# Patient Record
Sex: FEMALE | Race: WHITE | NOT HISPANIC OR LATINO | Employment: OTHER | URBAN - METROPOLITAN AREA
[De-identification: names, ages, dates, MRNs, and addresses within clinical notes are randomized per-mention and may not be internally consistent; named-entity substitution may affect disease eponyms.]

---

## 2023-04-27 ENCOUNTER — OFFICE VISIT (OUTPATIENT)
Dept: PRIMARY CARE | Facility: CLINIC | Age: 68
End: 2023-04-27
Payer: MEDICARE

## 2023-04-27 VITALS
WEIGHT: 256 LBS | OXYGEN SATURATION: 94 % | BODY MASS INDEX: 41.14 KG/M2 | SYSTOLIC BLOOD PRESSURE: 150 MMHG | HEART RATE: 59 BPM | TEMPERATURE: 97.6 F | HEIGHT: 66 IN | DIASTOLIC BLOOD PRESSURE: 78 MMHG

## 2023-04-27 DIAGNOSIS — R73.03 PRE-DIABETES: ICD-10-CM

## 2023-04-27 DIAGNOSIS — I10 BENIGN ESSENTIAL HYPERTENSION: ICD-10-CM

## 2023-04-27 DIAGNOSIS — E78.5 HYPERLIPIDEMIA LDL GOAL <70: ICD-10-CM

## 2023-04-27 DIAGNOSIS — R07.89 CHEST DISCOMFORT: ICD-10-CM

## 2023-04-27 DIAGNOSIS — M65.4 TENOSYNOVITIS, DE QUERVAIN: ICD-10-CM

## 2023-04-27 DIAGNOSIS — Z00.00 MEDICARE ANNUAL WELLNESS VISIT, SUBSEQUENT: Primary | ICD-10-CM

## 2023-04-27 DIAGNOSIS — Z12.31 ENCOUNTER FOR SCREENING MAMMOGRAM FOR MALIGNANT NEOPLASM OF BREAST: ICD-10-CM

## 2023-04-27 DIAGNOSIS — I25.10 ATHEROSCLEROSIS OF NATIVE CORONARY ARTERY OF NATIVE HEART WITHOUT ANGINA PECTORIS: ICD-10-CM

## 2023-04-27 PROBLEM — J30.2 SEASONAL ALLERGIC RHINITIS: Status: ACTIVE | Noted: 2020-06-16

## 2023-04-27 PROBLEM — I87.329 STASIS EDEMA WITH INFLAMMATION: Status: ACTIVE | Noted: 2023-04-27

## 2023-04-27 PROBLEM — R82.998 URINE LEUKOCYTES: Status: ACTIVE | Noted: 2023-04-27

## 2023-04-27 PROBLEM — M17.0 OSTEOARTHRITIS OF KNEES, BILATERAL: Status: ACTIVE | Noted: 2023-04-27

## 2023-04-27 PROBLEM — E78.00 PURE HYPERCHOLESTEROLEMIA: Status: ACTIVE | Noted: 2023-04-27

## 2023-04-27 PROBLEM — R93.89 ABNORMAL FINDING ON CT SCAN: Status: ACTIVE | Noted: 2023-04-27

## 2023-04-27 PROBLEM — M25.671 STIFFNESS OF RIGHT ANKLE, NOT ELSEWHERE CLASSIFIED: Status: ACTIVE | Noted: 2023-04-27

## 2023-04-27 PROBLEM — G89.29 CHRONIC BACK PAIN: Status: ACTIVE | Noted: 2023-04-27

## 2023-04-27 PROBLEM — N28.89 RENAL MASS, RIGHT: Status: ACTIVE | Noted: 2023-04-27

## 2023-04-27 PROBLEM — R05.3 CHRONIC COUGH: Status: ACTIVE | Noted: 2023-04-27

## 2023-04-27 PROBLEM — M47.816 LUMBAR SPONDYLOSIS: Status: ACTIVE | Noted: 2023-04-27

## 2023-04-27 PROBLEM — U07.1 COVID-19 VIRUS INFECTION: Status: ACTIVE | Noted: 2023-04-27

## 2023-04-27 PROBLEM — M12.9 ARTHROPATHY: Status: ACTIVE | Noted: 2023-04-27

## 2023-04-27 PROBLEM — M54.9 CHRONIC BACK PAIN: Status: ACTIVE | Noted: 2023-04-27

## 2023-04-27 PROBLEM — R10.9 ABDOMINAL PAIN: Status: ACTIVE | Noted: 2023-04-27

## 2023-04-27 PROBLEM — E88.810 METABOLIC SYNDROME: Status: ACTIVE | Noted: 2023-04-27

## 2023-04-27 PROBLEM — K21.9 GASTROESOPHAGEAL REFLUX DISEASE WITHOUT ESOPHAGITIS: Status: ACTIVE | Noted: 2020-05-16

## 2023-04-27 PROBLEM — E66.9 OBESITY (BMI 30-39.9): Status: ACTIVE | Noted: 2023-04-27

## 2023-04-27 PROBLEM — K82.8 GALLBLADDER SLUDGE: Status: ACTIVE | Noted: 2023-04-27

## 2023-04-27 PROBLEM — R09.82 POST-NASAL DISCHARGE: Status: ACTIVE | Noted: 2023-04-27

## 2023-04-27 PROBLEM — J37.0 CHRONIC LARYNGITIS: Status: ACTIVE | Noted: 2020-05-16

## 2023-04-27 PROBLEM — G47.33 OBSTRUCTIVE SLEEP APNEA SYNDROME: Status: ACTIVE | Noted: 2023-04-27

## 2023-04-27 PROBLEM — N28.1 COMPLEX RENAL CYST: Status: ACTIVE | Noted: 2023-04-27

## 2023-04-27 PROBLEM — K80.20 CHOLELITHIASES: Status: ACTIVE | Noted: 2023-04-27

## 2023-04-27 PROCEDURE — 3008F BODY MASS INDEX DOCD: CPT | Performed by: INTERNAL MEDICINE

## 2023-04-27 PROCEDURE — 93000 ELECTROCARDIOGRAM COMPLETE: CPT | Performed by: INTERNAL MEDICINE

## 2023-04-27 PROCEDURE — 1159F MED LIST DOCD IN RCRD: CPT | Performed by: INTERNAL MEDICINE

## 2023-04-27 PROCEDURE — 4010F ACE/ARB THERAPY RXD/TAKEN: CPT | Performed by: INTERNAL MEDICINE

## 2023-04-27 PROCEDURE — 3078F DIAST BP <80 MM HG: CPT | Performed by: INTERNAL MEDICINE

## 2023-04-27 PROCEDURE — 1036F TOBACCO NON-USER: CPT | Performed by: INTERNAL MEDICINE

## 2023-04-27 PROCEDURE — 3077F SYST BP >= 140 MM HG: CPT | Performed by: INTERNAL MEDICINE

## 2023-04-27 PROCEDURE — 1170F FXNL STATUS ASSESSED: CPT | Performed by: INTERNAL MEDICINE

## 2023-04-27 PROCEDURE — G0439 PPPS, SUBSEQ VISIT: HCPCS | Performed by: INTERNAL MEDICINE

## 2023-04-27 RX ORDER — VALSARTAN 160 MG/1
TABLET ORAL
COMMUNITY
End: 2023-04-27 | Stop reason: ALTCHOICE

## 2023-04-27 RX ORDER — METFORMIN HYDROCHLORIDE 500 MG/1
500 TABLET ORAL
COMMUNITY
Start: 2020-08-31 | End: 2023-04-27 | Stop reason: SDUPTHER

## 2023-04-27 RX ORDER — ATENOLOL 100 MG/1
TABLET ORAL
COMMUNITY
Start: 2022-09-20

## 2023-04-27 RX ORDER — METFORMIN HYDROCHLORIDE 500 MG/1
500 TABLET ORAL
Qty: 90 TABLET | Refills: 3 | Status: SHIPPED | OUTPATIENT
Start: 2023-04-27 | End: 2023-10-11 | Stop reason: SDUPTHER

## 2023-04-27 RX ORDER — OMEPRAZOLE 40 MG/1
CAPSULE, DELAYED RELEASE ORAL
COMMUNITY
End: 2023-04-27 | Stop reason: ALTCHOICE

## 2023-04-27 RX ORDER — CETIRIZINE HYDROCHLORIDE 10 MG/1
TABLET ORAL
COMMUNITY

## 2023-04-27 RX ORDER — MONTELUKAST SODIUM 10 MG/1
TABLET ORAL
COMMUNITY
End: 2023-04-27 | Stop reason: ALTCHOICE

## 2023-04-27 RX ORDER — HYDROCHLOROTHIAZIDE 25 MG/1
TABLET ORAL
COMMUNITY

## 2023-04-27 RX ORDER — AMLODIPINE BESYLATE 10 MG/1
10 TABLET ORAL DAILY
COMMUNITY
Start: 2022-08-15

## 2023-04-27 RX ORDER — FLUOCINOLONE ACETONIDE 0.11 MG/ML
OIL AURICULAR (OTIC)
COMMUNITY
End: 2023-04-27 | Stop reason: ALTCHOICE

## 2023-04-27 RX ORDER — LOSARTAN POTASSIUM 100 MG/1
TABLET ORAL
COMMUNITY

## 2023-04-27 RX ORDER — ATORVASTATIN CALCIUM 40 MG/1
TABLET, FILM COATED ORAL
COMMUNITY
Start: 2022-09-20

## 2023-04-27 RX ORDER — MULTIVIT-MIN/FERROUS SULFATE 4.5 MG
TABLET ORAL
COMMUNITY
Start: 2007-05-30

## 2023-04-27 RX ORDER — MINERAL OIL
ENEMA (ML) RECTAL
COMMUNITY
Start: 2020-08-15 | End: 2023-04-27 | Stop reason: ALTCHOICE

## 2023-04-27 RX ORDER — FLUTICASONE PROPIONATE 50 MCG
SPRAY, SUSPENSION (ML) NASAL
COMMUNITY
End: 2023-04-27 | Stop reason: ALTCHOICE

## 2023-04-27 RX ORDER — NAPROXEN 500 MG/1
TABLET ORAL
COMMUNITY
End: 2023-04-27 | Stop reason: ALTCHOICE

## 2023-04-27 RX ORDER — OMEPRAZOLE 20 MG/1
CAPSULE, DELAYED RELEASE ORAL
COMMUNITY
Start: 2022-11-29 | End: 2024-04-11 | Stop reason: ALTCHOICE

## 2023-04-27 RX ORDER — TRIAMCINOLONE ACETONIDE 1 MG/ML
LOTION TOPICAL
COMMUNITY
End: 2023-04-27 | Stop reason: ALTCHOICE

## 2023-04-27 RX ORDER — MV-MN/C/THEANINE/HERB NO.310 1000-200MG
POWDER IN PACKET (EA) ORAL
COMMUNITY
End: 2023-04-27 | Stop reason: ALTCHOICE

## 2023-04-27 RX ORDER — AZELASTINE 1 MG/ML
SPRAY, METERED NASAL EVERY 12 HOURS
COMMUNITY
End: 2023-04-27 | Stop reason: ALTCHOICE

## 2023-04-27 RX ORDER — HYDROCHLOROTHIAZIDE 25 MG/1
TABLET ORAL
COMMUNITY
Start: 2022-09-20 | End: 2023-04-27 | Stop reason: ALTCHOICE

## 2023-04-27 RX ORDER — BENZONATATE 100 MG/1
CAPSULE ORAL
COMMUNITY
Start: 2022-07-17 | End: 2023-04-27 | Stop reason: ALTCHOICE

## 2023-04-27 RX ORDER — KETOCONAZOLE 20 MG/G
CREAM TOPICAL
COMMUNITY
End: 2023-04-27 | Stop reason: ALTCHOICE

## 2023-04-27 RX ORDER — ASPIRIN 81 MG/1
TABLET ORAL
COMMUNITY
End: 2024-04-11 | Stop reason: SDUPTHER

## 2023-04-27 RX ORDER — FENOFIBRATE 145 MG/1
TABLET, FILM COATED ORAL
COMMUNITY
Start: 2022-09-20

## 2023-04-27 RX ORDER — EPINEPHRINE 0.22MG
1 AEROSOL WITH ADAPTER (ML) INHALATION
COMMUNITY
Start: 2011-11-16

## 2023-04-27 ASSESSMENT — ACTIVITIES OF DAILY LIVING (ADL)
DOING_HOUSEWORK: INDEPENDENT
DRESSING: INDEPENDENT
BATHING: INDEPENDENT
GROCERY_SHOPPING: INDEPENDENT
TAKING_MEDICATION: INDEPENDENT
MANAGING_FINANCES: INDEPENDENT

## 2023-04-27 ASSESSMENT — PATIENT HEALTH QUESTIONNAIRE - PHQ9
1. LITTLE INTEREST OR PLEASURE IN DOING THINGS: NOT AT ALL
2. FEELING DOWN, DEPRESSED OR HOPELESS: NOT AT ALL
SUM OF ALL RESPONSES TO PHQ9 QUESTIONS 1 AND 2: 0

## 2023-04-27 NOTE — PROGRESS NOTES
Medicare Wellness Billing Compliance Satisfied    Review all medications by prescribing practitioner or clinical pharmacist (such as prescriptions, OTCs, herbal therapies and supplements) documented in the medical record    Past Medical, Surgical, and Family History reviewed and updated in chart    Tobacco Use Reviewed    Alcohol Use Reviewed    Illicit Drug Use Reviewed    PHQ2/9    Falls in Last Year Reviewed    Home Safety Risk Factors Reviewed    Cognitive Impairment Reviewed    Patient Self Assessment and Health Status    Current Diet Reviewed    Exercise Frequency    ADL - Hearing Impairment    ADL - Bathing    ADL - Dressing    ADL - Walks in Home    IADL - Managing Finances    IADL - Grocery Shopping    IADL - Taking Medications    IADL - Doing Housework    Lauren Rivers is a 67 y.o. female here for a Medicare Wellness Exam.    Chief Complaint   Patient presents with    Medicare Annual Wellness Visit Subsequent        Medicare Wellness Exam  Pcp: Wendy Bright MD    The patent is being seen for a follow up annual wellness visit  Past Medical, Surgical and family History: Reviewed and updated in chart  Interval History: Patient has not been hospitalized previously  Medications and Supplements: Review of all medications by a prescribing practitioner or clinical pharmacist (such as prescriptions, OTC, Herbal therapies and supplements) documented in the medical record.    Past Medical History:   Diagnosis Date    Achilles tendinitis, right leg 11/18/2019    Achilles bursitis of right lower extremity    Atherosclerotic heart disease of native coronary artery without angina pectoris 03/31/2022    CAD (coronary artery disease)    Dorsalgia, unspecified 07/30/2021    Right-sided back pain    Encounter for general adult medical examination without abnormal findings 12/14/2020    Medicare welcome exam    Essential (primary) hypertension 12/12/2022    Benign essential hypertension    Metabolic  syndrome 10/11/2022    Metabolic syndrome    Other chest pain 09/15/2020    Pain, chest wall    Other malaise 09/11/2019    Debility    Other specified disorders of synovium and tendon, other site 01/10/2020    Achilles tendinosis of right lower extremity    Pain in right ankle and joints of right foot 03/05/2020    Right ankle pain    Pain in right knee     Right knee pain, unspecified chronicity    Pain in unspecified ankle and joints of unspecified foot 11/14/2019    Ankle joint pain    Personal history of diseases of the skin and subcutaneous tissue 12/10/2019    History of seborrheic dermatitis    Personal history of other diseases of the musculoskeletal system and connective tissue 12/18/2017    History of lateral epicondylitis of left elbow    Personal history of other diseases of the nervous system and sense organs 01/31/2021    History of sleep apnea    Personal history of other diseases of the nervous system and sense organs 10/12/2015    History of sleep apnea    Personal history of other diseases of urinary system 04/30/2021    History of stress incontinence    Personal history of other endocrine, nutritional and metabolic disease 12/15/2017    History of obesity    Personal history of other infectious and parasitic diseases 04/16/2014    History of onychomycosis    Personal history of other mental and behavioral disorders 03/08/2021    History of anxiety    Personal history of other specified conditions 04/26/2016    History of balance disorder    Personal history of other specified conditions 05/25/2022    History of abdominal pain    Personal history of other specified conditions 07/29/2021    History of flank pain    Personal history of other specified conditions 05/05/2020    History of hoarseness    Personal history of other specified conditions 04/29/2016    History of balance disorder    Rash and other nonspecific skin eruption 12/24/2020    Rash    Unspecified inflammation of eyelid 01/26/2021  "   Dermatitis of eyelid        Review of Systems         3/31/2022    11:01 AM 3/31/2022    11:39 AM 5/25/2022     1:32 PM 10/11/2022    11:40 AM 10/11/2022    12:18 PM 11/29/2022    11:18 AM 4/27/2023     9:38 AM   Vitals   Systolic 144 144 124 152 118 128 150   Diastolic 74 74 80 79 76 73 78   Heart Rate 52  53 77  55 59   Temp   37 °C (98.6 °F) 36.6 °C (97.8 °F)  36.4 °C (97.5 °F) 36.4 °C (97.6 °F)   Resp 16  18   16    Height (in) 1.651 m (5' 5\")  1.702 m (5' 7\")   1.702 m (5' 7\") 1.664 m (5' 5.5\")   Weight (lb) 253.31  250   254.7 256   BMI 42.15 kg/m2  39.16 kg/m2   39.89 kg/m2 41.95 kg/m2   BSA (m2) 2.3 m2  2.31 m2   2.34 m2 2.32 m2   Visit Report       Report        Physical Exam:  /78   Pulse 59   Temp 36.4 °C (97.6 °F)   Ht 1.664 m (5' 5.5\")   Wt 116 kg (256 lb)   SpO2 94%   BMI 41.95 kg/m²    Patient appears well, alert and oriented x 3, cooperative. No depression or anxiety.   Vitals are as documented.  Neck is supple and free of adenopathy, or masses. No thyromegaly.   PERRL, extra eye muscles are intact.  Ears, throat are normal.  Heart sounds are normal, no murmur, gallops or rubs.   Lungs are clear to auscultation    Abdomen is soft, no tenderness, masses or organomegaly.  Extremities are normal. Peripheral pulses are normal.   Neurologic exam is normal without focal findings.   Skin is normal without suspicious lesions noted.   No acute joint swelling or pain.       No results found for requested labs within last 365 days.     1. Medicare annual wellness visit, subsequent  ***    2. Chest discomfort  ***  - ECG 12 lead (Clinic Performed)    3. Benign essential hypertension  ***    4. Atherosclerosis of native coronary artery of native heart without angina pectoris  ***  - ECG 12 lead (Clinic Performed)    5. Pre-diabetes  ***  - Albumin , Urine Random; Future  - Hemoglobin A1C; Future  - Comprehensive Metabolic Panel; Future    6. Hyperlipidemia LDL goal <70  ***  - Lipid Panel; Future   "

## 2023-04-27 NOTE — PROGRESS NOTES
Medicare Wellness Billing Compliance Satisfied    Review all medications by prescribing practitioner or clinical pharmacist (such as prescriptions, OTCs, herbal therapies and supplements) documented in the medical record    Past Medical, Surgical, and Family History reviewed and updated in chart    Tobacco Use Reviewed    Alcohol Use Reviewed    Illicit Drug Use Reviewed    PHQ2/9    Falls in Last Year Reviewed    Home Safety Risk Factors Reviewed    Cognitive Impairment Reviewed    Patient Self Assessment and Health Status    Current Diet Reviewed    Exercise Frequency    ADL - Hearing Impairment    ADL - Bathing    ADL - Dressing    ADL - Walks in Home    IADL - Managing Finances    IADL - Grocery Shopping    IADL - Taking Medications    IADL - Doing Housework      Lauren Rivers is a 67 y.o. female here for a Medicare Wellness Exam.    Chief Complaint   Patient presents with    Medicare Annual Wellness Visit Subsequent        Medicare Wellness Exam  Pcp: Wendy Bright MD    The patent is being seen for a follow up annual wellness visit  Past Medical, Surgical and family History: Reviewed and updated in chart  Interval History: Patient has not been hospitalized previously  Medications and Supplements: Review of all medications by a prescribing practitioner or clinical pharmacist (such as prescriptions, OTC, Herbal therapies and supplements) documented in the medical record.  Lab Results   Component Value Date    WBC 7.3 05/24/2022    HGB 14.1 05/24/2022    HCT 42.7 05/24/2022     05/24/2022    CHOL 142 05/24/2022    TRIG 160 (H) 05/24/2022    HDL 41.4 05/24/2022    ALT 22 05/24/2022    AST 21 05/24/2022     05/24/2022    K 4.4 05/24/2022     05/24/2022    CREATININE 0.84 05/24/2022    BUN 18 05/24/2022    CO2 29 05/24/2022    HGBA1C 5.8 (A) 05/24/2022       Review of Systems   Upper mid chest mild discomfort at times. She sees cardiologist yearly, in November, for hx of  "bypass CAD.  No sob.  No leg edema.  Had endometrial biopsy at [facility] last year, did not hear about result. I advised pt to call them to verify.          3/31/2022    11:01 AM 3/31/2022    11:39 AM 5/25/2022     1:32 PM 10/11/2022    11:40 AM 10/11/2022    12:18 PM 11/29/2022    11:18 AM 4/27/2023     9:38 AM   Vitals   Systolic 144 144 124 152 118 128 150   Diastolic 74 74 80 79 76 73 78   Heart Rate 52  53 77  55 59   Temp   37 °C (98.6 °F) 36.6 °C (97.8 °F)  36.4 °C (97.5 °F) 36.4 °C (97.6 °F)   Resp 16  18   16    Height (in) 1.651 m (5' 5\")  1.702 m (5' 7\")   1.702 m (5' 7\") 1.664 m (5' 5.5\")   Weight (lb) 253.31  250   254.7 256   BMI 42.15 kg/m2  39.16 kg/m2   39.89 kg/m2 41.95 kg/m2   BSA (m2) 2.3 m2  2.31 m2   2.34 m2 2.32 m2   Visit Report       Report        Physical Exam:  /78   Pulse 59   Temp 36.4 °C (97.6 °F)   Ht 1.664 m (5' 5.5\")   Wt 116 kg (256 lb)   SpO2 94%   BMI 41.95 kg/m²    Patient appears well, alert and oriented x 3, cooperative. No depression or anxiety.   Vitals are as documented.  Neck is supple and free of adenopathy, or masses. No thyromegaly.   PERRL, extra eye muscles are intact.   Heart sounds are normal, no murmur, gallops or rubs.   Lungs are clear to auscultation    Abdomen is soft, no tenderness, masses or organomegaly.  Extremities are normal. Peripheral pulses are normal.   Neurologic exam is normal without focal findings.   Skin is normal without suspicious lesions noted.   No acute joint swelling or pain.        1. Medicare annual wellness visit, subsequent       2. Chest discomfort     - ECG 12 lead (Clinic Performed)    3. Benign essential hypertension       4. Atherosclerosis of native coronary artery of native heart without angina pectoris     - ECG 12 lead (Clinic Performed)    5. Pre-diabetes   Refilled metformin, stable   - Albumin , Urine Random; Future  - Hemoglobin A1C; Future  - Comprehensive Metabolic Panel; Future    6. Hyperlipidemia LDL goal <70   "   - Lipid Panel; Future     Follow up yearly.    Also referred pt to see hand ortho for right wrist pain, De quervain synovitis

## 2023-05-04 ENCOUNTER — LAB (OUTPATIENT)
Dept: LAB | Facility: LAB | Age: 68
End: 2023-05-04
Payer: MEDICARE

## 2023-05-04 DIAGNOSIS — R73.03 PRE-DIABETES: ICD-10-CM

## 2023-05-04 DIAGNOSIS — E78.5 HYPERLIPIDEMIA LDL GOAL <70: ICD-10-CM

## 2023-05-04 LAB
ALANINE AMINOTRANSFERASE (SGPT) (U/L) IN SER/PLAS: 24 U/L (ref 7–45)
ALBUMIN (G/DL) IN SER/PLAS: 4.1 G/DL (ref 3.4–5)
ALBUMIN (MG/L) IN URINE: 9.5 MG/L
ALBUMIN/CREATININE (UG/MG) IN URINE: 11.8 UG/MG CRT (ref 0–30)
ALKALINE PHOSPHATASE (U/L) IN SER/PLAS: 58 U/L (ref 33–136)
ANION GAP IN SER/PLAS: 10 MMOL/L (ref 10–20)
ASPARTATE AMINOTRANSFERASE (SGOT) (U/L) IN SER/PLAS: 24 U/L (ref 9–39)
BILIRUBIN TOTAL (MG/DL) IN SER/PLAS: 0.6 MG/DL (ref 0–1.2)
CALCIUM (MG/DL) IN SER/PLAS: 10 MG/DL (ref 8.6–10.6)
CARBON DIOXIDE, TOTAL (MMOL/L) IN SER/PLAS: 30 MMOL/L (ref 21–32)
CHLORIDE (MMOL/L) IN SER/PLAS: 102 MMOL/L (ref 98–107)
CHOLESTEROL (MG/DL) IN SER/PLAS: 116 MG/DL (ref 0–199)
CHOLESTEROL IN HDL (MG/DL) IN SER/PLAS: 39 MG/DL
CHOLESTEROL/HDL RATIO: 3
CREATININE (MG/DL) IN SER/PLAS: 0.85 MG/DL (ref 0.5–1.05)
CREATININE (MG/DL) IN URINE: 80.8 MG/DL (ref 20–320)
ESTIMATED AVERAGE GLUCOSE FOR HBA1C: 126 MG/DL
GFR FEMALE: 75 ML/MIN/1.73M2
GLUCOSE (MG/DL) IN SER/PLAS: 104 MG/DL (ref 74–99)
HEMOGLOBIN A1C/HEMOGLOBIN TOTAL IN BLOOD: 6 %
LDL: 45 MG/DL (ref 0–99)
POTASSIUM (MMOL/L) IN SER/PLAS: 3.8 MMOL/L (ref 3.5–5.3)
PROTEIN TOTAL: 7.2 G/DL (ref 6.4–8.2)
SODIUM (MMOL/L) IN SER/PLAS: 138 MMOL/L (ref 136–145)
TRIGLYCERIDE (MG/DL) IN SER/PLAS: 158 MG/DL (ref 0–149)
UREA NITROGEN (MG/DL) IN SER/PLAS: 17 MG/DL (ref 6–23)
VLDL: 32 MG/DL (ref 0–40)

## 2023-05-04 PROCEDURE — 82043 UR ALBUMIN QUANTITATIVE: CPT

## 2023-05-04 PROCEDURE — 82570 ASSAY OF URINE CREATININE: CPT

## 2023-05-04 PROCEDURE — 80053 COMPREHEN METABOLIC PANEL: CPT

## 2023-05-04 PROCEDURE — 80061 LIPID PANEL: CPT

## 2023-05-04 PROCEDURE — 36415 COLL VENOUS BLD VENIPUNCTURE: CPT

## 2023-05-04 PROCEDURE — 83036 HEMOGLOBIN GLYCOSYLATED A1C: CPT

## 2023-10-04 ENCOUNTER — TELEPHONE (OUTPATIENT)
Dept: PRIMARY CARE | Facility: CLINIC | Age: 68
End: 2023-10-04
Payer: MEDICARE

## 2023-10-04 NOTE — TELEPHONE ENCOUNTER
Pt lvm stating she want to schedule for right shoulder pain and flu shot    Lvm to call back to schedule

## 2023-10-06 PROBLEM — R49.8 OTHER VOICE AND RESONANCE DISORDERS: Status: ACTIVE | Noted: 2020-05-16

## 2023-10-06 PROBLEM — D48.5 NEOPLASM OF UNCERTAIN BEHAVIOR OF SKIN: Status: ACTIVE | Noted: 2022-10-12

## 2023-10-06 PROBLEM — M18.11 LOCALIZED PRIMARY OSTEOARTHRITIS OF CARPOMETACARPAL JOINT OF RIGHT THUMB: Status: ACTIVE | Noted: 2023-10-06

## 2023-10-06 PROBLEM — L21.9 SEBORRHEIC DERMATITIS, UNSPECIFIED: Status: ACTIVE | Noted: 2022-10-12

## 2023-10-06 PROBLEM — D18.01 HEMANGIOMA OF SKIN AND SUBCUTANEOUS TISSUE: Status: ACTIVE | Noted: 2022-10-12

## 2023-10-06 PROBLEM — L57.0 ACTINIC KERATOSIS: Status: ACTIVE | Noted: 2022-10-12

## 2023-10-06 RX ORDER — MINERAL OIL
1 ENEMA (ML) RECTAL DAILY
COMMUNITY
Start: 2021-01-26 | End: 2024-04-11 | Stop reason: ALTCHOICE

## 2023-10-06 RX ORDER — MONTELUKAST SODIUM 10 MG/1
10 TABLET ORAL DAILY
COMMUNITY
End: 2024-04-11 | Stop reason: ALTCHOICE

## 2023-10-06 RX ORDER — ZINC GLUCONATE 13.3 MG
200 LOZENGE ORAL
COMMUNITY
Start: 2023-08-17 | End: 2024-04-11 | Stop reason: ALTCHOICE

## 2023-10-11 ENCOUNTER — OFFICE VISIT (OUTPATIENT)
Dept: PRIMARY CARE | Facility: CLINIC | Age: 68
End: 2023-10-11
Payer: MEDICARE

## 2023-10-11 VITALS
BODY MASS INDEX: 41.82 KG/M2 | DIASTOLIC BLOOD PRESSURE: 71 MMHG | WEIGHT: 251 LBS | SYSTOLIC BLOOD PRESSURE: 126 MMHG | HEART RATE: 53 BPM | TEMPERATURE: 98.6 F | OXYGEN SATURATION: 94 % | HEIGHT: 65 IN

## 2023-10-11 DIAGNOSIS — G89.29 CHRONIC RIGHT SHOULDER PAIN: ICD-10-CM

## 2023-10-11 DIAGNOSIS — Z23 INFLUENZA VACCINE NEEDED: ICD-10-CM

## 2023-10-11 DIAGNOSIS — M75.81 ROTATOR CUFF TENDONITIS, RIGHT: ICD-10-CM

## 2023-10-11 DIAGNOSIS — M25.511 CHRONIC RIGHT SHOULDER PAIN: ICD-10-CM

## 2023-10-11 DIAGNOSIS — R73.03 PRE-DIABETES: ICD-10-CM

## 2023-10-11 DIAGNOSIS — E11.9 CONTROLLED TYPE 2 DIABETES MELLITUS WITHOUT COMPLICATION, WITHOUT LONG-TERM CURRENT USE OF INSULIN (MULTI): ICD-10-CM

## 2023-10-11 DIAGNOSIS — N64.4 BREAST PAIN, LEFT: Primary | ICD-10-CM

## 2023-10-11 PROCEDURE — 3078F DIAST BP <80 MM HG: CPT | Performed by: INTERNAL MEDICINE

## 2023-10-11 PROCEDURE — 3044F HG A1C LEVEL LT 7.0%: CPT | Performed by: INTERNAL MEDICINE

## 2023-10-11 PROCEDURE — 99214 OFFICE O/P EST MOD 30 MIN: CPT | Performed by: INTERNAL MEDICINE

## 2023-10-11 PROCEDURE — 3074F SYST BP LT 130 MM HG: CPT | Performed by: INTERNAL MEDICINE

## 2023-10-11 PROCEDURE — G0008 ADMIN INFLUENZA VIRUS VAC: HCPCS | Performed by: INTERNAL MEDICINE

## 2023-10-11 PROCEDURE — 3008F BODY MASS INDEX DOCD: CPT | Performed by: INTERNAL MEDICINE

## 2023-10-11 PROCEDURE — 1036F TOBACCO NON-USER: CPT | Performed by: INTERNAL MEDICINE

## 2023-10-11 PROCEDURE — 90662 IIV NO PRSV INCREASED AG IM: CPT | Performed by: INTERNAL MEDICINE

## 2023-10-11 PROCEDURE — 4010F ACE/ARB THERAPY RXD/TAKEN: CPT | Performed by: INTERNAL MEDICINE

## 2023-10-11 PROCEDURE — 1159F MED LIST DOCD IN RCRD: CPT | Performed by: INTERNAL MEDICINE

## 2023-10-11 RX ORDER — METFORMIN HYDROCHLORIDE 500 MG/1
500 TABLET ORAL
Qty: 90 TABLET | Refills: 1 | Status: SHIPPED | OUTPATIENT
Start: 2023-10-11 | End: 2024-04-05

## 2023-10-11 NOTE — PROGRESS NOTES
"PCP: Wendy Bright MD   I have reviewed existing histories, notes, past medical history, surgical history, social history, family history, med list, allergy list, and immunization, and updated.    HPI:   Left breast pain and right shoulder pain.  left breast pain since sometime after last mammogram in the spring.  No Fever and chills  No discharge    Right shoulder pain for 6-8 months. No injury prior to onset. It is just sore, and painful in the back of the shoulder to palpation and to move the shoulder in certain ways. Sometime it is hard to lift arm in the shower. No Numbness, tingling or weakness of the arm. No neck pain    Lab Results   Component Value Date    WBC 7.3 05/24/2022    HGB 14.1 05/24/2022    HCT 42.7 05/24/2022     05/24/2022    CHOL 116 05/04/2023    TRIG 158 (H) 05/04/2023    HDL 39.0 (A) 05/04/2023    ALT 24 05/04/2023    AST 24 05/04/2023     05/04/2023    K 3.8 05/04/2023     05/04/2023    CREATININE 0.85 05/04/2023    BUN 17 05/04/2023    CO2 30 05/04/2023    HGBA1C 6.0 (A) 05/04/2023     Physical exam:  /71   Pulse 53   Temp 37 °C (98.6 °F)   Ht 1.651 m (5' 5\")   Wt 114 kg (251 lb)   SpO2 94%   BMI 41.77 kg/m²     Left breast is tender to palpation at left lower quadrant. No mass or cyst palpable. No axillary lymphadenopathy.  right shoulder decreased rom certain ways, and some pain with active movement of rotator muscles. No weakness.  Neurovascularly intact      Assessments/orders:   1. Breast pain, left  BI US breast complete left      2. Pre-diabetes        3. Influenza vaccine needed  Flu vaccine, quadrivalent, high-dose, preservative free, age 65y+ (FLUZONE)      4. Chronic right shoulder pain  Referral to Physical Therapy      5. Rotator cuff tendonitis, right  Referral to Physical Therapy      6. Controlled type 2 diabetes mellitus without complication, without long-term current use of insulin (CMS/[facility])  metFORMIN (Glucophage) 500 mg tablet      Will " call her with ultrasound result

## 2023-10-13 ENCOUNTER — ANCILLARY PROCEDURE (OUTPATIENT)
Dept: RADIOLOGY | Facility: CLINIC | Age: 68
End: 2023-10-13
Payer: MEDICARE

## 2023-10-13 DIAGNOSIS — N64.4 BREAST PAIN, LEFT: ICD-10-CM

## 2023-10-13 PROCEDURE — 76642 ULTRASOUND BREAST LIMITED: CPT | Mod: LEFT SIDE | Performed by: STUDENT IN AN ORGANIZED HEALTH CARE EDUCATION/TRAINING PROGRAM

## 2023-10-13 PROCEDURE — 76642 ULTRASOUND BREAST LIMITED: CPT | Mod: LT

## 2023-10-16 DIAGNOSIS — N64.4 BREAST PAIN, LEFT: Primary | ICD-10-CM

## 2023-11-15 ENCOUNTER — ANCILLARY PROCEDURE (OUTPATIENT)
Dept: RADIOLOGY | Facility: CLINIC | Age: 68
End: 2023-11-15
Payer: MEDICARE

## 2023-11-15 DIAGNOSIS — N28.1 CYST OF KIDNEY, ACQUIRED: ICD-10-CM

## 2023-11-15 PROCEDURE — 76770 US EXAM ABDO BACK WALL COMP: CPT

## 2023-11-15 PROCEDURE — 76770 US EXAM ABDO BACK WALL COMP: CPT | Performed by: RADIOLOGY

## 2023-12-18 ENCOUNTER — TELEMEDICINE (OUTPATIENT)
Dept: UROLOGY | Facility: CLINIC | Age: 68
End: 2023-12-18
Payer: MEDICARE

## 2023-12-18 DIAGNOSIS — N28.1 COMPLEX RENAL CYST: Primary | ICD-10-CM

## 2023-12-18 PROCEDURE — 99213 OFFICE O/P EST LOW 20 MIN: CPT | Performed by: UROLOGY

## 2023-12-18 NOTE — PROGRESS NOTES
Subjective     This visit was completed via telemedicine. All issues as below were discussed and addressed but no physical exam was performed unless allowed by visual confirmation. If it was felt that the patient should be evaluated in clinic, then they were directed there. Patient verbally consented to visit.    Lauren Rivers is a 68 y.o. female with history of Bosniak 2F complex renal cyst who presents today for follow up to review renal US. Patient is doing well. Denies any recent gross hematuria, fevers, chills, urinary retention, intractable flank or abdominal pain, nausea or vomiting.        Past Medical History:   Diagnosis Date    Achilles tendinitis, right leg 11/18/2019    Achilles bursitis of right lower extremity    Atherosclerotic heart disease of native coronary artery without angina pectoris 03/31/2022    CAD (coronary artery disease)    Dorsalgia, unspecified 07/30/2021    Right-sided back pain    Encounter for general adult medical examination without abnormal findings 12/14/2020    Medicare welcome exam    Essential (primary) hypertension 12/12/2022    Benign essential hypertension    Metabolic syndrome 10/11/2022    Metabolic syndrome    Other chest pain 09/15/2020    Pain, chest wall    Other malaise 09/11/2019    Debility    Other specified disorders of synovium and tendon, other site 01/10/2020    Achilles tendinosis of right lower extremity    Pain in right ankle and joints of right foot 03/05/2020    Right ankle pain    Pain in right knee     Right knee pain, unspecified chronicity    Pain in unspecified ankle and joints of unspecified foot 11/14/2019    Ankle joint pain    Personal history of diseases of the skin and subcutaneous tissue 12/10/2019    History of seborrheic dermatitis    Personal history of other diseases of the musculoskeletal system and connective tissue 12/18/2017    History of lateral epicondylitis of left elbow    Personal history of other diseases of the nervous system  and sense organs 01/31/2021    History of sleep apnea    Personal history of other diseases of the nervous system and sense organs 10/12/2015    History of sleep apnea    Personal history of other diseases of urinary system 04/30/2021    History of stress incontinence    Personal history of other endocrine, nutritional and metabolic disease 12/15/2017    History of obesity    Personal history of other infectious and parasitic diseases 04/16/2014    History of onychomycosis    Personal history of other mental and behavioral disorders 03/08/2021    History of anxiety    Personal history of other specified conditions 04/26/2016    History of balance disorder    Personal history of other specified conditions 05/25/2022    History of abdominal pain    Personal history of other specified conditions 07/29/2021    History of flank pain    Personal history of other specified conditions 05/05/2020    History of hoarseness    Personal history of other specified conditions 04/29/2016    History of balance disorder    Rash and other nonspecific skin eruption 12/24/2020    Rash    Unspecified inflammation of eyelid 01/26/2021    Dermatitis of eyelid     Past Surgical History:   Procedure Laterality Date    CORONARY ARTERY BYPASS GRAFT  04/26/2016    CABG    WRIST FRACTURE SURGERY Left      Family History   Problem Relation Name Age of Onset    Diabetes Mother      Thyroid disease Mother      Diabetes Father      Kidney disease Father      Hypertension Father      Glaucoma Father       Current Outpatient Medications   Medication Sig Dispense Refill    amLODIPine (Norvasc) 10 mg tablet Take 1 tablet (10 mg) by mouth once daily.      aspirin 81 mg capsule Take 81 mg by mouth once daily.      aspirin 81 mg EC tablet       atenolol (Tenormin) 100 mg tablet atenolol 100 mg tablet      atorvastatin (Lipitor) 40 mg tablet atorvastatin 40 mg tablet      CALCIUM ACETATE ORAL Take 500 mg by mouth twice a day.      cetirizine (ZyrTEC) 10 mg  tablet Take by mouth.      cimetidine (Tagamet HB) 200 mg tablet Take 1 tablet (200 mg) by mouth.      coenzyme Q-10 100 mg capsule Take 1 capsule (100 mg) by mouth once daily.      diclofenac sodium 1 % kit diclofenac 1 % topical gel      ERGOCALCIFEROL, VITAMIN D2, IM Take by mouth.      fenofibrate (Tricor) 145 mg tablet fenofibrate nanocrystallized 145 mg tablet      fexofenadine (Allegra) 180 mg tablet Take 1 tablet (180 mg) by mouth once daily.      hydroCHLOROthiazide (HYDRODiuril) 25 mg tablet hydrochlorothiazide 25 mg tablet      losartan (Cozaar) 100 mg tablet Take by mouth.      metFORMIN (Glucophage) 500 mg tablet Take 1 tablet (500 mg) by mouth once daily. 90 tablet 1    montelukast (Singulair) 10 mg tablet Take 1 tablet (10 mg) by mouth once daily.      multivit-min-ferrous sulfate (One Daily Multi-Vit w-Mineral) 4.5 mg iron tablet Take by mouth.      omega-3s-dha-epa-fish oil-D3 560 mg-1,680 mg -1,000 unit/5mL liquid Take by mouth.      omeprazole (PriLOSEC) 20 mg DR capsule Take by mouth.       No current facility-administered medications for this visit.     Allergies   Allergen Reactions    Oxycodone Other    Tramadol Other     Social History     Socioeconomic History    Marital status:      Spouse name: Not on file    Number of children: Not on file    Years of education: Not on file    Highest education level: Not on file   Occupational History    Not on file   Tobacco Use    Smoking status: Never    Smokeless tobacco: Never   Substance and Sexual Activity    Alcohol use: Never    Drug use: Never    Sexual activity: Not on file   Other Topics Concern    Not on file   Social History Narrative    Not on file     Social Determinants of Health     Financial Resource Strain: Not on file   Food Insecurity: Not on file   Transportation Needs: Not on file   Physical Activity: Not on file   Stress: Not on file   Social Connections: Not on file   Intimate Partner Violence: Not on file   Housing  Stability: Not on file       Review of Systems  Pertinent items are noted in HPI.    Objective     Lab Review  Lab Results   Component Value Date    WBC 7.3 05/24/2022    RBC 4.90 05/24/2022    HGB 14.1 05/24/2022    HCT 42.7 05/24/2022     05/24/2022      Lab Results   Component Value Date    BUN 17 05/04/2023    CREATININE 0.85 05/04/2023          Assessment/Plan   Diagnoses and all orders for this visit:  Complex renal cyst  -     US renal complete; Future  Complex Renal Cyst    I reviewed renal US from 11/15/2023 which shows No hydronephrosis bilaterally. Right renal 1.6 cm mildly complex cyst containing thin septation  similar to prior.    I discussed the findings with the patient.     We will follow up  virtually in 1 year with renal US.     All questions were answered to the patient's satisfaction. Patient agrees with the plan and wishes to proceed. Follow-up will be scheduled appropriately.     Scribed for Dr. Pimentel by Olimpia Up. I , Dr Pimentel, have personally reviewed and agreed with the information entered by the Virtual Scribe.       Olimpia Up

## 2024-02-16 ENCOUNTER — EVALUATION (OUTPATIENT)
Dept: PHYSICAL THERAPY | Facility: CLINIC | Age: 69
End: 2024-02-16
Payer: MEDICARE

## 2024-02-16 ENCOUNTER — TELEPHONE (OUTPATIENT)
Dept: PHYSICAL THERAPY | Facility: CLINIC | Age: 69
End: 2024-02-16

## 2024-02-16 DIAGNOSIS — M75.81 ROTATOR CUFF TENDONITIS, RIGHT: ICD-10-CM

## 2024-02-16 DIAGNOSIS — M25.511 CHRONIC RIGHT SHOULDER PAIN: ICD-10-CM

## 2024-02-16 DIAGNOSIS — G89.29 CHRONIC RIGHT SHOULDER PAIN: ICD-10-CM

## 2024-02-16 PROCEDURE — 97535 SELF CARE MNGMENT TRAINING: CPT | Mod: GP | Performed by: PHYSICAL THERAPIST

## 2024-02-16 PROCEDURE — 97161 PT EVAL LOW COMPLEX 20 MIN: CPT | Mod: GP | Performed by: PHYSICAL THERAPIST

## 2024-02-16 ASSESSMENT — ENCOUNTER SYMPTOMS
DEPRESSION: 0
OCCASIONAL FEELINGS OF UNSTEADINESS: 0
LOSS OF SENSATION IN FEET: 0

## 2024-02-16 NOTE — PROGRESS NOTES
Physical Therapy Evaluation    Patient Name: Lauren Rivers  MRN: [id number]  Today's Date: 2/16/2024  Referred by: Dr. Bright  Time Calculation  Start Time: 0915  Stop Time: 0955  Time Calculation (min): 40 min  Diagnosis:  1. Chronic right shoulder pain  Referral to Physical Therapy    Follow Up In Physical Therapy      2. Rotator cuff tendonitis, right  Referral to Physical Therapy    Follow Up In Physical Therapy      PRECAUTIONS:   none    SUBJECTIVE:  Patient reports insidious onset of right shoulder pain that started last year, denies injury or history, reports recently the pain has been increasing, now with constant soreness that increases with activity, does have night pain, no diagnostic tests.  Pain:  2-8/10 right shoulder  Home Living:  No issues  Prior level of function:  Decreased function with RUE die to pain, no history prior to 2023    OBJECTIVE:  Shoulder AROM: (degrees) Left Right   Flexion  Full*   Abduction  Full*   Extension  full   External Rotation  full   Internal Rotation       full     Shoulder PROM: (degrees) Left Right   Flexion  full   Abduction  full   Extension  full   External Rotation  full   Internal Rotation  full     Shoulder Strength: MMT Left Right   Flexion /5 4+/5*   Abduction /5 4+/5*   External Rotation /5 4+/5*   Internal Rotation /5 5/5   *denotes pain with motion or muscle testing    Positive Special Tests:  + impingement tests  Palpation:  + tenderness anterior and lateral right shoulder  Posture:  Rounded shoulders  Functional Outcome Measure:  SPADI: 77%    ASSESSMENT:  Patient presents with signs of right shoulder RC tendinitis and impingement, very symptomatic at time of evaluation, rec activity modification, consistent icing, isometric exercises for several weeks to see if we can reduce pain, may benefit from cortisone injection if symptoms persist.    TREATMENT:  Initial evaluation performed followed by discussion of findings and instruction in HEP.    PATIENT  EDUCATION:  Access Code: [email or web address]  URL: [email or web address]  Date: 02/16/2024  Prepared by: John Rizvi    Exercises  - Scaption Wall Slide with Towel  - 2 x daily - 7 x weekly - 1 sets - 10 reps  - Standing Isometric Shoulder Internal Rotation at Doorway  - 2 x daily - 7 x weekly - 1 sets - 5 reps - 30-45 hold  - Standing Isometric Shoulder External Rotation with Doorway  - 2 x daily - 7 x weekly - 1 sets - 5 reps - 30-45 hold    PLAN:   HEP twice daily, ice frequently throughout day, limit activity, follow up in 3-4 weeks.    Rehab potential:  Good  Plan of care agreement  Patient    GOALS:  Active       PT Problem       Decrease c/o right shoulder pain to 2/10 at worst       Start:  02/16/24    Expected End:  04/16/24            Patient independent with HEP and able to perform all ADL's with minimal pain       Start:  02/16/24    Expected End:  04/16/24            Improve SPADI score by at least 30%       Start:  02/16/24    Expected End:  04/16/24

## 2024-04-03 PROBLEM — Z79.82 LONG TERM (CURRENT) USE OF ASPIRIN: Status: ACTIVE | Noted: 2023-02-08

## 2024-04-03 PROBLEM — N64.4 PAIN OF LEFT BREAST: Status: ACTIVE | Noted: 2023-10-13

## 2024-04-03 PROBLEM — L98.9 SKIN LESION: Status: ACTIVE | Noted: 2024-04-03

## 2024-04-03 PROBLEM — M79.673 HEEL PAIN: Status: ACTIVE | Noted: 2024-04-03

## 2024-04-03 PROBLEM — R49.0 HOARSENESS: Status: ACTIVE | Noted: 2024-04-03

## 2024-04-03 PROBLEM — N28.1 CYST OF KIDNEY, ACQUIRED: Status: ACTIVE | Noted: 2023-11-15

## 2024-04-03 PROBLEM — S39.92XA INJURY OF LOW BACK: Status: ACTIVE | Noted: 2024-04-03

## 2024-04-03 PROBLEM — N39.3 STRESS INCONTINENCE OF URINE: Status: ACTIVE | Noted: 2024-04-03

## 2024-04-03 PROBLEM — E66.01 SEVERE OBESITY (BMI >= 40) (MULTI): Status: ACTIVE | Noted: 2023-04-06

## 2024-04-03 PROBLEM — E87.6 HYPOKALEMIA: Status: ACTIVE | Noted: 2024-04-03

## 2024-04-03 PROBLEM — M77.12 LATERAL EPICONDYLITIS OF LEFT ELBOW: Status: ACTIVE | Noted: 2024-04-03

## 2024-04-03 PROBLEM — L82.0 INFLAMED SEBORRHEIC KERATOSIS: Status: ACTIVE | Noted: 2019-09-04

## 2024-04-03 PROBLEM — M25.569 KNEE PAIN: Status: ACTIVE | Noted: 2024-04-03

## 2024-04-03 PROBLEM — Z99.89 DEPENDENCE ON OTHER ENABLING MACHINES AND DEVICES: Status: ACTIVE | Noted: 2023-04-06

## 2024-04-03 PROBLEM — R26.89 IMPAIRMENT OF BALANCE: Status: ACTIVE | Noted: 2024-04-03

## 2024-04-03 RX ORDER — ESCITALOPRAM OXALATE 5 MG/1
TABLET ORAL
COMMUNITY
Start: 2020-12-14 | End: 2024-04-11 | Stop reason: ALTCHOICE

## 2024-04-05 DIAGNOSIS — E11.9 CONTROLLED TYPE 2 DIABETES MELLITUS WITHOUT COMPLICATION, WITHOUT LONG-TERM CURRENT USE OF INSULIN (MULTI): ICD-10-CM

## 2024-04-05 RX ORDER — METFORMIN HYDROCHLORIDE 500 MG/1
500 TABLET ORAL DAILY
Qty: 90 TABLET | Refills: 0 | Status: SHIPPED | OUTPATIENT
Start: 2024-04-05 | End: 2024-04-11 | Stop reason: SDUPTHER

## 2024-04-11 ENCOUNTER — OFFICE VISIT (OUTPATIENT)
Dept: PRIMARY CARE | Facility: CLINIC | Age: 69
End: 2024-04-11
Payer: MEDICARE

## 2024-04-11 VITALS
OXYGEN SATURATION: 94 % | SYSTOLIC BLOOD PRESSURE: 119 MMHG | DIASTOLIC BLOOD PRESSURE: 66 MMHG | HEIGHT: 65 IN | HEART RATE: 56 BPM | TEMPERATURE: 98.2 F | BODY MASS INDEX: 41.82 KG/M2 | WEIGHT: 251 LBS

## 2024-04-11 DIAGNOSIS — Z11.59 NEED FOR HEPATITIS C SCREENING TEST: ICD-10-CM

## 2024-04-11 DIAGNOSIS — Z12.31 ENCOUNTER FOR SCREENING MAMMOGRAM FOR MALIGNANT NEOPLASM OF BREAST: ICD-10-CM

## 2024-04-11 DIAGNOSIS — E66.01 MORBID OBESITY WITH BMI OF 40.0-44.9, ADULT (MULTI): ICD-10-CM

## 2024-04-11 DIAGNOSIS — Z00.00 PHYSICAL EXAM: ICD-10-CM

## 2024-04-11 DIAGNOSIS — E11.9 CONTROLLED TYPE 2 DIABETES MELLITUS WITHOUT COMPLICATION, WITHOUT LONG-TERM CURRENT USE OF INSULIN (MULTI): ICD-10-CM

## 2024-04-11 DIAGNOSIS — Z00.00 MEDICARE ANNUAL WELLNESS VISIT, SUBSEQUENT: Primary | ICD-10-CM

## 2024-04-11 PROCEDURE — 4010F ACE/ARB THERAPY RXD/TAKEN: CPT | Performed by: INTERNAL MEDICINE

## 2024-04-11 PROCEDURE — 1159F MED LIST DOCD IN RCRD: CPT | Performed by: INTERNAL MEDICINE

## 2024-04-11 PROCEDURE — 1036F TOBACCO NON-USER: CPT | Performed by: INTERNAL MEDICINE

## 2024-04-11 PROCEDURE — 99397 PER PM REEVAL EST PAT 65+ YR: CPT | Performed by: INTERNAL MEDICINE

## 2024-04-11 PROCEDURE — 3078F DIAST BP <80 MM HG: CPT | Performed by: INTERNAL MEDICINE

## 2024-04-11 PROCEDURE — 1170F FXNL STATUS ASSESSED: CPT | Performed by: INTERNAL MEDICINE

## 2024-04-11 PROCEDURE — 3008F BODY MASS INDEX DOCD: CPT | Performed by: INTERNAL MEDICINE

## 2024-04-11 PROCEDURE — G0439 PPPS, SUBSEQ VISIT: HCPCS | Performed by: INTERNAL MEDICINE

## 2024-04-11 PROCEDURE — 3074F SYST BP LT 130 MM HG: CPT | Performed by: INTERNAL MEDICINE

## 2024-04-11 RX ORDER — BENZONATATE 200 MG/1
CAPSULE ORAL
COMMUNITY
Start: 2024-03-24 | End: 2024-04-11 | Stop reason: ALTCHOICE

## 2024-04-11 RX ORDER — METFORMIN HYDROCHLORIDE 500 MG/1
500 TABLET ORAL DAILY
Qty: 90 TABLET | Refills: 3 | Status: SHIPPED | OUTPATIENT
Start: 2024-04-11

## 2024-04-11 ASSESSMENT — ACTIVITIES OF DAILY LIVING (ADL)
TAKING_MEDICATION: INDEPENDENT
GROCERY_SHOPPING: INDEPENDENT
MANAGING_FINANCES: INDEPENDENT
DOING_HOUSEWORK: INDEPENDENT
BATHING: INDEPENDENT
DRESSING: INDEPENDENT

## 2024-04-11 ASSESSMENT — PATIENT HEALTH QUESTIONNAIRE - PHQ9
SUM OF ALL RESPONSES TO PHQ9 QUESTIONS 1 AND 2: 0
2. FEELING DOWN, DEPRESSED OR HOPELESS: NOT AT ALL
1. LITTLE INTEREST OR PLEASURE IN DOING THINGS: NOT AT ALL

## 2024-04-11 NOTE — PROGRESS NOTES
"SUBJECTIVE:   Lauren Rivers is a 68 y.o. female presenting for her annual physical/wellness.  PCP: Wendy Bright MD  Medicare wellness and Follow up   Doing well.  Has Post nasal drainage, and dry cough from it for long time. She would like to know what she can use to help. Did try zyrtec did not seem to help. No chest congestion, wheezing etc. No leg pain swelling. No sob.  It is all year round.     Colon screenin, Colonoscopy, to repeat in 5 years  Last pap: na  Last mammogram: due  Dexa normal dexa , normal   Vaccines Up to date   Diet:   healthy in general  Exercises:  regularly  Lab Results   Component Value Date    HGBA1C 6.0 (A) 2023     Lab Results   Component Value Date    CREATININE 0.85 2023     Lab Results   Component Value Date    WBC 7.3 2022    HGB 14.1 2022    HCT 42.7 2022    MCV 87 2022     2022     Lab Results   Component Value Date    CHOL 116 2023    CHOL 142 2022     Lab Results   Component Value Date    HDL 39.0 (A) 2023    HDL 41.4 2022     No results found for: \"LDLCALC\"  Lab Results   Component Value Date    TRIG 158 (H) 2023    TRIG 160 (H) 2022     No components found for: \"CHOLHDL\"       ROS:   Feeling well. No dyspnea or chest pain on exertion. No abdominal pain, change in bowel habits, black or bloody stools. No urinary tract or  symptoms.  No breast concerns. No neurological complaints.    OBJECTIVE:   The patient appears well, alert, oriented x 3, in no distress.   /66   Pulse 56   Temp 36.8 °C (98.2 °F)   Ht 1.645 m (5' 4.75\")   Wt 114 kg (251 lb)   SpO2 94%   BMI 42.09 kg/m²   ENT normal.  Neck supple. No adenopathy or thyromegaly. MIKE. Lungs are clear, good air entry, no wheezes, rhonchi or rales. S1 and S2 normal, no murmurs, regular rate and rhythm. Abdomen is soft without tenderness, guarding, mass or organomegaly.  exam deferred to Gyn. Extremities show no edema, " normal peripheral pulses. Neurological is normal without focal findings.      1. Medicare annual wellness visit, subsequent        2. Controlled type 2 diabetes mellitus without complication, without long-term current use of insulin (CMS/HCC)        3. Physical exam        4. Encounter for screening mammogram for malignant neoplasm of breast        5. Need for hepatitis C screening test          She asked if she should go on GLP-1 agonist. She does like injections though. She said wegovy is covered by medicare, and she is considering it. No personal or family hx of endocrine tumor.  BMI 42.   Is trying to do diet.  Will need more exercises

## 2024-04-11 NOTE — PATIENT INSTRUCTIONS
Please follow up with your dermatologist.  Can use Flonase for chronic cough caused by Post nasal drainage. This is over the counter  Please walk more, at least 3-4 x per week, at least 30 minutes at a time.  Please get fasting labs done at your convenience

## 2024-04-23 ENCOUNTER — LAB (OUTPATIENT)
Dept: LAB | Facility: LAB | Age: 69
End: 2024-04-23
Payer: MEDICARE

## 2024-04-23 DIAGNOSIS — Z00.00 MEDICARE ANNUAL WELLNESS VISIT, SUBSEQUENT: ICD-10-CM

## 2024-04-23 DIAGNOSIS — Z00.00 PHYSICAL EXAM: ICD-10-CM

## 2024-04-23 DIAGNOSIS — Z11.59 NEED FOR HEPATITIS C SCREENING TEST: ICD-10-CM

## 2024-04-23 DIAGNOSIS — E11.9 CONTROLLED TYPE 2 DIABETES MELLITUS WITHOUT COMPLICATION, WITHOUT LONG-TERM CURRENT USE OF INSULIN (MULTI): ICD-10-CM

## 2024-04-23 LAB
ALBUMIN SERPL BCP-MCNC: 4 G/DL (ref 3.4–5)
ALP SERPL-CCNC: 45 U/L (ref 33–136)
ALT SERPL W P-5'-P-CCNC: 21 U/L (ref 7–45)
ANION GAP SERPL CALC-SCNC: 12 MMOL/L (ref 10–20)
AST SERPL W P-5'-P-CCNC: 22 U/L (ref 9–39)
BILIRUB SERPL-MCNC: 0.5 MG/DL (ref 0–1.2)
BUN SERPL-MCNC: 21 MG/DL (ref 6–23)
CALCIUM SERPL-MCNC: 10.2 MG/DL (ref 8.6–10.6)
CHLORIDE SERPL-SCNC: 100 MMOL/L (ref 98–107)
CHOLEST SERPL-MCNC: 125 MG/DL (ref 0–199)
CHOLESTEROL/HDL RATIO: 2.8
CO2 SERPL-SCNC: 31 MMOL/L (ref 21–32)
CREAT SERPL-MCNC: 0.95 MG/DL (ref 0.5–1.05)
CREAT UR-MCNC: 127.4 MG/DL (ref 20–320)
EGFRCR SERPLBLD CKD-EPI 2021: 65 ML/MIN/1.73M*2
ERYTHROCYTE [DISTWIDTH] IN BLOOD BY AUTOMATED COUNT: 14.6 % (ref 11.5–14.5)
EST. AVERAGE GLUCOSE BLD GHB EST-MCNC: 114 MG/DL
GLUCOSE SERPL-MCNC: 102 MG/DL (ref 74–99)
HBA1C MFR BLD: 5.6 %
HCT VFR BLD AUTO: 44.2 % (ref 36–46)
HCV AB SER QL: NONREACTIVE
HDLC SERPL-MCNC: 44 MG/DL
HGB BLD-MCNC: 14.1 G/DL (ref 12–16)
LDLC SERPL CALC-MCNC: 35 MG/DL
MCH RBC QN AUTO: 27.9 PG (ref 26–34)
MCHC RBC AUTO-ENTMCNC: 31.9 G/DL (ref 32–36)
MCV RBC AUTO: 88 FL (ref 80–100)
MICROALBUMIN UR-MCNC: 54.8 MG/L
MICROALBUMIN/CREAT UR: 43 UG/MG CREAT
NON HDL CHOLESTEROL: 81 MG/DL (ref 0–149)
NRBC BLD-RTO: 0 /100 WBCS (ref 0–0)
PLATELET # BLD AUTO: 150 X10*3/UL (ref 150–450)
POTASSIUM SERPL-SCNC: 4.2 MMOL/L (ref 3.5–5.3)
PROT SERPL-MCNC: 7.2 G/DL (ref 6.4–8.2)
RBC # BLD AUTO: 5.05 X10*6/UL (ref 4–5.2)
SODIUM SERPL-SCNC: 139 MMOL/L (ref 136–145)
TRIGL SERPL-MCNC: 231 MG/DL (ref 0–149)
VLDL: 46 MG/DL (ref 0–40)
WBC # BLD AUTO: 6.1 X10*3/UL (ref 4.4–11.3)

## 2024-04-23 PROCEDURE — 86803 HEPATITIS C AB TEST: CPT

## 2024-04-23 PROCEDURE — 80053 COMPREHEN METABOLIC PANEL: CPT

## 2024-04-23 PROCEDURE — 80061 LIPID PANEL: CPT

## 2024-04-23 PROCEDURE — 85027 COMPLETE CBC AUTOMATED: CPT

## 2024-04-23 PROCEDURE — 36415 COLL VENOUS BLD VENIPUNCTURE: CPT

## 2024-04-23 PROCEDURE — 82043 UR ALBUMIN QUANTITATIVE: CPT

## 2024-04-23 PROCEDURE — 82570 ASSAY OF URINE CREATININE: CPT

## 2024-04-23 PROCEDURE — 83036 HEMOGLOBIN GLYCOSYLATED A1C: CPT

## 2024-05-20 ENCOUNTER — HOSPITAL ENCOUNTER (OUTPATIENT)
Dept: RADIOLOGY | Facility: CLINIC | Age: 69
Discharge: HOME | End: 2024-05-20
Payer: MEDICARE

## 2024-05-20 VITALS — WEIGHT: 250 LBS | BODY MASS INDEX: 41.65 KG/M2 | HEIGHT: 65 IN

## 2024-05-20 DIAGNOSIS — Z12.31 ENCOUNTER FOR SCREENING MAMMOGRAM FOR MALIGNANT NEOPLASM OF BREAST: ICD-10-CM

## 2024-05-20 PROCEDURE — 77067 SCR MAMMO BI INCL CAD: CPT | Performed by: RADIOLOGY

## 2024-05-20 PROCEDURE — 77063 BREAST TOMOSYNTHESIS BI: CPT | Performed by: RADIOLOGY

## 2024-05-20 PROCEDURE — 77067 SCR MAMMO BI INCL CAD: CPT

## 2024-05-27 DIAGNOSIS — N63.20 MASS OF LEFT BREAST, UNSPECIFIED QUADRANT: Primary | ICD-10-CM

## 2024-05-27 DIAGNOSIS — R92.8 ABNORMAL MAMMOGRAM: ICD-10-CM

## 2024-05-28 ENCOUNTER — HOSPITAL ENCOUNTER (OUTPATIENT)
Dept: RADIOLOGY | Facility: CLINIC | Age: 69
Discharge: HOME | End: 2024-05-28
Payer: MEDICARE

## 2024-05-28 DIAGNOSIS — N63.20 MASS OF LEFT BREAST, UNSPECIFIED QUADRANT: ICD-10-CM

## 2024-05-28 DIAGNOSIS — R92.8 ABNORMAL MAMMOGRAM: ICD-10-CM

## 2024-05-28 PROCEDURE — 76982 USE 1ST TARGET LESION: CPT | Mod: LT

## 2024-05-28 PROCEDURE — 76642 ULTRASOUND BREAST LIMITED: CPT | Mod: LEFT SIDE | Performed by: RADIOLOGY

## 2024-05-28 PROCEDURE — 76642 ULTRASOUND BREAST LIMITED: CPT | Mod: LT

## 2024-05-29 DIAGNOSIS — R92.8 ABNORMAL MAMMOGRAM: Primary | ICD-10-CM

## 2024-06-22 ENCOUNTER — LAB REQUISITION (OUTPATIENT)
Dept: LAB | Facility: HOSPITAL | Age: 69
End: 2024-06-22
Payer: MEDICARE

## 2024-06-22 DIAGNOSIS — J02.9 ACUTE PHARYNGITIS, UNSPECIFIED: ICD-10-CM

## 2024-06-22 PROCEDURE — 87651 STREP A DNA AMP PROBE: CPT

## 2024-06-23 LAB — S PYO DNA THROAT QL NAA+PROBE: NOT DETECTED

## 2024-08-15 ENCOUNTER — APPOINTMENT (OUTPATIENT)
Dept: SLEEP MEDICINE | Facility: CLINIC | Age: 69
End: 2024-08-15
Payer: MEDICARE

## 2024-08-22 ENCOUNTER — TELEPHONE (OUTPATIENT)
Dept: PRIMARY CARE | Facility: CLINIC | Age: 69
End: 2024-08-22
Payer: MEDICARE

## 2024-08-22 DIAGNOSIS — R53.81 DEBILITY: Primary | ICD-10-CM

## 2024-08-22 NOTE — TELEPHONE ENCOUNTER
Please ask pt as to why she needs a disability parking. In past one year, no mention of any diagnosis or symptoms that points to condition needing disability parking.

## 2024-08-23 NOTE — TELEPHONE ENCOUNTER
Pt states that she have a hard time walking. She was suppose to get her achilles tendon repaired but could not do it because poor circulation. She states that it is hard to walk stairs can't stand for more then 5 min without support.

## 2024-08-29 NOTE — PROGRESS NOTES
Subjective     Lauren Rivers is a 69 y.o. female who presents for the following: Skin Check.  She notes a raised, rough spot on her right upper cheek, which has been present for several years.  She states it has been itching recently, but it has not changed in any other way, including in size, shape, or color, and it does not hurt or bleed.  She also notes a dry, flaky scalp.  She denies any other new, changing, or concerning skin lesions since her last visit; no bleeding, itching, or burning lesions.      Review of Systems:  No other skin or systemic complaints other than what is documented elsewhere in the note.    The following portions of the chart were reviewed this encounter and updated as appropriate:       Skin Cancer History  No skin cancer on file.    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; her  has been seen in our office in the past as well, and they have 2 children    Allergies:  Oxycodone and Tramadol    Current Medications / CAM's:    Current Outpatient Medications:     amLODIPine (Norvasc) 10 mg tablet, Take 1 tablet (10 mg) by mouth once daily., Disp: , Rfl:     aspirin 81 mg capsule, Take 81 mg by mouth once daily., Disp: , Rfl:     atenolol (Tenormin) 100 mg tablet, atenolol 100 mg tablet, Disp: , Rfl:     atorvastatin (Lipitor) 40 mg tablet, atorvastatin 40 mg tablet, Disp: , Rfl:     CALCIUM ACETATE ORAL, Take 500 mg by mouth twice a day., Disp: , Rfl:     cetirizine (ZyrTEC) 10 mg tablet, Take by mouth., Disp: ,  Rfl:     coenzyme Q-10 100 mg capsule, Take 1 capsule (100 mg) by mouth once daily., Disp: , Rfl:     diclofenac sodium 1 % kit, diclofenac 1 % topical gel, Disp: , Rfl:     ERGOCALCIFEROL, VITAMIN D2, IM, Take by mouth., Disp: , Rfl:     fenofibrate (Tricor) 145 mg tablet, fenofibrate nanocrystallized 145 mg tablet, Disp: , Rfl:     hydroCHLOROthiazide (HYDRODiuril) 25 mg tablet, hydrochlorothiazide 25 mg tablet, Disp: , Rfl:     losartan (Cozaar) 100 mg tablet, Take by mouth., Disp: , Rfl:     metFORMIN (Glucophage) 500 mg tablet, Take 1 tablet (500 mg) by mouth once daily., Disp: 90 tablet, Rfl: 3    multivit-min-ferrous sulfate (One Daily Multi-Vit w-Mineral) 4.5 mg iron tablet, Take by mouth., Disp: , Rfl:     omega-3s-dha-epa-fish oil-D3 560 mg-1,680 mg -1,000 unit/5mL liquid, Take by mouth., Disp: , Rfl:     ketoconazole (NIZOral) 2 % cream, Apply topically 2 times a day. To affected areas of feet for 3-4 weeks; repeat as needed for flares, Disp: 60 g, Rfl: 11    ketoconazole (NIZOral) 2 % shampoo, Wash affected areas of scalp 2-3 times weekly as directed, Disp: 120 mL, Rfl: 11     Objective   Well appearing patient in no apparent distress; mood and affect are within normal limits.    A full examination was performed including scalp, face, eyes, ears, nose, lips, neck, chest, axillae, abdomen, back, bilateral upper extremities, and bilateral lower extremities. All findings within normal limits unless otherwise noted below.    Assessment/Plan   1. Neoplasm of uncertain behavior of skin  Left Upper Cheek  7 mm dark brown pigmented, asymmetric macule with an asymmetric pigment network and irregular borders           Shave removal    Lesion length (cm):  0.7  Margin per side (cm):  0  Lesion diameter (cm):  0.7  Informed consent: discussed and consent obtained    Timeout: patient name, date of birth, surgical site, and procedure verified    Procedure prep:  Patient was prepped and draped  Anesthesia: the lesion  was anesthetized in a standard fashion    Anesthetic:  1% lidocaine w/ epinephrine 1-100,000 local infiltration  Instrument used: flexible razor blade    Hemostasis achieved with: aluminum chloride    Outcome: patient tolerated procedure well    Post-procedure details: sterile dressing applied and wound care instructions given    Dressing type: bandage and petrolatum      Staff Communication: Dermatology Local Anesthesia: 1 % Lidocaine / Epinephrine - Amount:0.5ml    Specimen 1 - Dermatopathology- DERM LAB  Differential Diagnosis: SK v lentigo v AMH v pigmented AK v SCCIS  Check Margins Yes/No?:    Comments:    Dermpath Lab: Routine Histopathology (formalin-fixed tissue)    2. Tinea pedis of left foot  Left Foot - Anterior  On the patient's bilateral feet, there is moist scaling with maceration and fissures in the lateral webspaces and erythematous, scaly, thin plaques in a moccasin-type distribution on the soles and heels.    Tinea Pedis - flare on bilateral feet.  The fungal nature of this condition and treatment options were discussed extensively with the patient today.  At this time, I recommend topical anti-fungal therapy with Ketoconazole 2% cream, which the patient was instructed to apply twice daily to the affected areas of the feet for the next 3-4 weeks; the patient may repeat treatment in a similar fashion as needed for future flares.  The risks, benefits, and side effects of this medication were discussed.  The patient expressed understanding and is in agreement with this plan.    ketoconazole (NIZOral) 2 % cream - Left Foot - Anterior  Apply topically 2 times a day. To affected areas of feet for 3-4 weeks; repeat as needed for flares    3. Tinea pedis of right foot    Related Medications  ketoconazole (NIZOral) 2 % cream  Apply topically 2 times a day. To affected areas of feet for 3-4 weeks; repeat as needed for flares    4. Seborrheic dermatitis  Scalp  On the patient's scalp, there are pink, scaly  patches with whitish-yellowish, greasy scale    Seborrheic Dermatitis - scalp.  The potentially chronic and intermittently flaring nature of this condition and treatment options were discussed extensively with the patient today.  At this time, I recommend topical anti-fungal therapy with Ketoconazole 2% shampoo, which the patient was instructed to use 2-3 days per week, alternating with over-the-counter anti-dandruff shampoos, such as Head & Shoulders, Selsun Blue, and Neutrogena T-gel, every month.  The risks, benefits, and side effects of this medication were discussed.  The patient expressed understanding and is in agreement with this plan.    ketoconazole (NIZOral) 2 % shampoo - Scalp  Wash affected areas of scalp 2-3 times weekly as directed    5. Inflamed seborrheic keratosis  Head - Anterior (Face)  On the patient's right lateral upper cheek, there is a 1 cm erythematous and light brown-colored, hyperkeratotic, stuck-on appearing papule with a central pink, well-healed scar at her previous biopsy site and a surrounding rim of erythema    Inflamed biopsy-proven Seborrheic Keratosis -right lateral upper cheek.  The benign nature of this lesion was discussed with the patient today and reassurance provided.  Given the history the patient provides of frequent irritation and associated symptoms as well as its inflamed appearance on exam today, I offered to treat this lesion with liquid nitrogen cryotherapy.  The patient expressed understanding, is in agreement with this plan, and wishes to proceed with cryotherapy today.    Destr of lesion - Head - Anterior (Face)  Complexity: simple    Destruction method: cryotherapy    Informed consent: discussed and consent obtained    Lesion destroyed using liquid nitrogen: Yes    Cryotherapy cycles:  2  Outcome: patient tolerated procedure well with no complications    Post-procedure details: wound care instructions given      6. Actinic keratosis (16)  Head - Anterior (Face)  (16)  Scattered on the patient's face, there are multiple erythematous, gritty, scaly macules     Actinic Keratoses -scattered on face.  The pre-cancerous nature of these lesions and treatment options were discussed with the patient today.  At this time, I recommend treatment with liquid nitrogen cryotherapy.  The patient expressed understanding, is in agreement with this plan, and wishes to proceed with cryotherapy today.    Destr of lesion - Head - Anterior (Face) (16)  Complexity: simple    Destruction method: cryotherapy    Informed consent: discussed and consent obtained    Lesion destroyed using liquid nitrogen: Yes    Cryotherapy cycles:  1  Outcome: patient tolerated procedure well with no complications    Post-procedure details: wound care instructions given      7. Melanocytic nevus of trunk  Scattered on the patient's face, neck, trunk, and extremities, there are several small, round- to oval-shaped, brown-pigmented and pink-colored, symmetric, uniform-appearing macules and dome-shaped papules    Clinically benign- to slightly atypical-appearing nevi - the clinically benign- to slightly atypical-appearing nature of the patient's nevi was discussed with the patient today.  None of the patient's nevi meet threshold for biopsy today.  I emphasized the importance of performing monthly self-skin exams using the ABCDs of monitoring moles, which were reviewed with the patient today and an informational hand-out provided.  I also emphasized the importance of sun avoidance and sun protection with daily sunscreen use.  The patient expressed understanding and is in agreement with this plan.    8. Seborrheic keratosis  Scattered on the patient's face, neck, trunk, and extremities, there are multiple tan- to light brown-colored, hyperkeratotic, stuck-on appearing papules of varying size and shape    Seborrheic Keratoses - the benign nature of these lesions was discussed with the patient today and reassurance provided.   No treatment is medically indicated for the noninflamed SKs at this time.    9. Hemangioma of skin  Scattered on the patient's face, neck, trunk, and extremities, there are multiple small, round, cherry red- to purplish-colored, symmetric, uniform, vascular-appearing macules and papules    Cherry Angiomas - the benign nature of these vascular lesions was discussed with the patient today and reassurance provided.  No treatment is medically indicated for these lesions at this time.    10. History of actinic keratoses  There is evidence of photodamage in sun-exposed areas.    History of actinic keratoses and photodamage.  The signs and symptoms of skin cancer were reviewed and the patient was advised to practice sun protection and sun avoidance, use daily sunscreen, and perform regular self skin exams.  I will have the patient return to our office in 6 to 12 months, pending the above biopsy result, for routine follow-up and skin exam, and the patient was instructed to call our office should the patient notice any new, changing, symptomatic, or otherwise concerning skin lesions before then.  The patient expressed understanding and is in agreement with this plan.    11. Diffuse photodamage of skin  Photodistributed  Diffuse photodamage with actinic changes with telangiectasia and mottled pigmentation in sun-exposed areas.    Photodamage.  The signs and symptoms of skin cancer were reviewed and the patient was advised to practice sun protection and sun avoidance, use daily sunscreen, and perform regular self skin exams.  Sun protection was discussed, including avoiding the mid-day sun, wearing a sunscreen with SPF at least 50, and stressing the need for reapplication of sunscreen and applying more than they think they need.

## 2024-09-03 ENCOUNTER — APPOINTMENT (OUTPATIENT)
Dept: DERMATOLOGY | Facility: CLINIC | Age: 69
End: 2024-09-03
Payer: MEDICARE

## 2024-09-03 DIAGNOSIS — B35.3 TINEA PEDIS OF RIGHT FOOT: ICD-10-CM

## 2024-09-03 DIAGNOSIS — D48.5 NEOPLASM OF UNCERTAIN BEHAVIOR OF SKIN: Primary | ICD-10-CM

## 2024-09-03 DIAGNOSIS — L57.0 ACTINIC KERATOSIS: ICD-10-CM

## 2024-09-03 DIAGNOSIS — L57.8 DIFFUSE PHOTODAMAGE OF SKIN: ICD-10-CM

## 2024-09-03 DIAGNOSIS — B35.3 TINEA PEDIS OF LEFT FOOT: ICD-10-CM

## 2024-09-03 DIAGNOSIS — L82.0 INFLAMED SEBORRHEIC KERATOSIS: ICD-10-CM

## 2024-09-03 DIAGNOSIS — D18.01 HEMANGIOMA OF SKIN: ICD-10-CM

## 2024-09-03 DIAGNOSIS — L21.9 SEBORRHEIC DERMATITIS: ICD-10-CM

## 2024-09-03 DIAGNOSIS — Z87.2 HISTORY OF ACTINIC KERATOSES: ICD-10-CM

## 2024-09-03 DIAGNOSIS — D22.5 MELANOCYTIC NEVUS OF TRUNK: ICD-10-CM

## 2024-09-03 DIAGNOSIS — L82.1 SEBORRHEIC KERATOSIS: ICD-10-CM

## 2024-09-03 PROCEDURE — 3060F POS MICROALBUMINURIA REV: CPT | Performed by: DERMATOLOGY

## 2024-09-03 PROCEDURE — 99214 OFFICE O/P EST MOD 30 MIN: CPT | Performed by: DERMATOLOGY

## 2024-09-03 PROCEDURE — 4010F ACE/ARB THERAPY RXD/TAKEN: CPT | Performed by: DERMATOLOGY

## 2024-09-03 PROCEDURE — 11311 SHAVE SKIN LESION 0.6-1.0 CM: CPT | Performed by: DERMATOLOGY

## 2024-09-03 PROCEDURE — 1159F MED LIST DOCD IN RCRD: CPT | Performed by: DERMATOLOGY

## 2024-09-03 PROCEDURE — 17004 DESTROY PREMAL LESIONS 15/>: CPT | Performed by: DERMATOLOGY

## 2024-09-03 PROCEDURE — 3044F HG A1C LEVEL LT 7.0%: CPT | Performed by: DERMATOLOGY

## 2024-09-03 PROCEDURE — 17110 DESTRUCTION B9 LES UP TO 14: CPT | Performed by: DERMATOLOGY

## 2024-09-03 PROCEDURE — 3048F LDL-C <100 MG/DL: CPT | Performed by: DERMATOLOGY

## 2024-09-03 RX ORDER — KETOCONAZOLE 20 MG/ML
SHAMPOO, SUSPENSION TOPICAL
Qty: 120 ML | Refills: 11 | Status: SHIPPED | OUTPATIENT
Start: 2024-09-03

## 2024-09-03 RX ORDER — KETOCONAZOLE 20 MG/G
CREAM TOPICAL 2 TIMES DAILY
Qty: 60 G | Refills: 11 | Status: SHIPPED | OUTPATIENT
Start: 2024-09-03

## 2024-09-03 ASSESSMENT — DERMATOLOGY QUALITY OF LIFE (QOL) ASSESSMENT: ARE THERE EXCLUSIONS OR EXCEPTIONS FOR THE QUALITY OF LIFE ASSESSMENT: NO

## 2024-09-03 ASSESSMENT — DERMATOLOGY PATIENT ASSESSMENT
DO YOU USE SUNSCREEN: OCCASIONALLY
DO YOU HAVE ANY NEW OR CHANGING LESIONS: NO
ARE YOU AN ORGAN TRANSPLANT RECIPIENT: NO
DO YOU USE A TANNING BED: NO

## 2024-09-05 LAB
LABORATORY COMMENT REPORT: NORMAL
PATH REPORT.FINAL DX SPEC: NORMAL
PATH REPORT.GROSS SPEC: NORMAL
PATH REPORT.MICROSCOPIC SPEC OTHER STN: NORMAL
PATH REPORT.RELEVANT HX SPEC: NORMAL
PATH REPORT.TOTAL CANCER: NORMAL

## 2024-09-19 ENCOUNTER — OFFICE VISIT (OUTPATIENT)
Dept: URGENT CARE | Age: 69
End: 2024-09-19
Payer: MEDICARE

## 2024-09-19 VITALS
SYSTOLIC BLOOD PRESSURE: 122 MMHG | WEIGHT: 250 LBS | BODY MASS INDEX: 40.18 KG/M2 | RESPIRATION RATE: 18 BRPM | HEART RATE: 57 BPM | HEIGHT: 66 IN | DIASTOLIC BLOOD PRESSURE: 57 MMHG | OXYGEN SATURATION: 94 % | TEMPERATURE: 98.4 F

## 2024-09-19 DIAGNOSIS — H60.512 ACUTE ACTINIC OTITIS EXTERNA OF LEFT EAR: Primary | ICD-10-CM

## 2024-09-19 RX ORDER — NEOMYCIN SULFATE, POLYMYXIN B SULFATE, HYDROCORTISONE 3.5; 10000; 1 MG/ML; [USP'U]/ML; MG/ML
2 SOLUTION/ DROPS AURICULAR (OTIC) 4 TIMES DAILY
Qty: 2.8 ML | Refills: 0 | Status: SHIPPED | OUTPATIENT
Start: 2024-09-19 | End: 2024-09-26

## 2024-09-19 ASSESSMENT — ENCOUNTER SYMPTOMS
COUGH: 0
CHILLS: 0
RHINORRHEA: 0
ACTIVITY CHANGE: 0
FEVER: 0
FATIGUE: 0
FACIAL SWELLING: 0
SINUS PAIN: 0
SINUS PRESSURE: 0

## 2024-09-19 ASSESSMENT — PAIN SCALES - GENERAL: PAINLEVEL: 5

## 2024-09-19 NOTE — PROGRESS NOTES
Subjective   Patient ID: Lauren Rivers is a 69 y.o. female. They present today with a chief complaint of earwax impaction of left ear x1 week.    History of Present Illness  Pain, diminished hearing to the left ear for abut a week.  Denies other sx.  She was concerned about wax buildup, so she put some baby oil into her ear (per her 's drGigi) a couple of times, but I did not help.       History provided by:  Patient   used: No        Past Medical History  Allergies as of 09/19/2024 - Reviewed 09/19/2024   Allergen Reaction Noted    Oxycodone Other 04/27/2023    Tramadol Other 04/27/2023       (Not in a hospital admission)       Past Medical History:   Diagnosis Date    Achilles tendinitis, right leg 11/18/2019    Achilles bursitis of right lower extremity    Atherosclerotic heart disease of native coronary artery without angina pectoris 03/31/2022    CAD (coronary artery disease)    Dorsalgia, unspecified 07/30/2021    Right-sided back pain    Encounter for general adult medical examination without abnormal findings 12/14/2020    Medicare welcome exam    Essential (primary) hypertension 12/12/2022    Benign essential hypertension    Metabolic syndrome 10/11/2022    Metabolic syndrome    Other chest pain 09/15/2020    Pain, chest wall    Other malaise 09/11/2019    Debility    Other specified disorders of synovium and tendon, other site 01/10/2020    Achilles tendinosis of right lower extremity    Pain in right ankle and joints of right foot 03/05/2020    Right ankle pain    Pain in right knee     Right knee pain, unspecified chronicity    Pain in unspecified ankle and joints of unspecified foot 11/14/2019    Ankle joint pain    Personal history of diseases of the skin and subcutaneous tissue 12/10/2019    History of seborrheic dermatitis    Personal history of other diseases of the musculoskeletal system and connective tissue 12/18/2017    History of lateral epicondylitis of left elbow     Personal history of other diseases of the nervous system and sense organs 01/31/2021    History of sleep apnea    Personal history of other diseases of the nervous system and sense organs 10/12/2015    History of sleep apnea    Personal history of other diseases of urinary system 04/30/2021    History of stress incontinence    Personal history of other endocrine, nutritional and metabolic disease 12/15/2017    History of obesity    Personal history of other infectious and parasitic diseases 04/16/2014    History of onychomycosis    Personal history of other mental and behavioral disorders 03/08/2021    History of anxiety    Personal history of other specified conditions 04/26/2016    History of balance disorder    Personal history of other specified conditions 05/25/2022    History of abdominal pain    Personal history of other specified conditions 07/29/2021    History of flank pain    Personal history of other specified conditions 05/05/2020    History of hoarseness    Personal history of other specified conditions 04/29/2016    History of balance disorder    Rash and other nonspecific skin eruption 12/24/2020    Rash    Unspecified inflammation of eyelid 01/26/2021    Dermatitis of eyelid       Past Surgical History:   Procedure Laterality Date    CORONARY ARTERY BYPASS GRAFT  04/26/2016    CABG    WRIST FRACTURE SURGERY Left         reports that she has never smoked. She has never used smokeless tobacco. She reports that she does not drink alcohol and does not use drugs.    Review of Systems  Review of Systems   Constitutional:  Negative for activity change, chills, fatigue and fever.   HENT:  Positive for ear pain and hearing loss. Negative for congestion, ear discharge, facial swelling, rhinorrhea, sinus pressure, sinus pain and tinnitus.    Respiratory:  Negative for cough.                                   Objective    Vitals:    09/19/24 1549   BP: 122/57   BP Location: Left arm   Patient Position: Sitting  "  BP Cuff Size: Large adult   Pulse: 57   Resp: 18   Temp: 36.9 °C (98.4 °F)   TempSrc: Oral   SpO2: 94%   Weight: 113 kg (250 lb)   Height: 1.676 m (5' 6\")     No LMP recorded. Patient is postmenopausal.    Physical Exam  Constitutional:       General: She is not in acute distress.     Appearance: Normal appearance. She is obese. She is not ill-appearing, toxic-appearing or diaphoretic.   HENT:      Head: Normocephalic and atraumatic.      Right Ear: Tympanic membrane, ear canal and external ear normal. There is no impacted cerumen.      Ears:      Comments: Left EAC mildy edematous.  +pain with insertion of the speculum of the otoscope and not able to tolerate full exam.  +pain with traction to pinna and tragus.   Pulmonary:      Effort: Pulmonary effort is normal. No respiratory distress.   Skin:     General: Skin is warm and dry.   Neurological:      General: No focal deficit present.      Mental Status: She is alert and oriented to person, place, and time.   Psychiatric:         Mood and Affect: Mood normal.         Behavior: Behavior normal.         Thought Content: Thought content normal.         Judgment: Judgment normal.         Procedures    Point of Care Test & Imaging Results from this visit  No results found for this visit on 09/19/24.   No results found.    Diagnostic study results (if any) were reviewed by [facility].    Assessment/Plan   Allergies, medications, history, and pertinent labs/EKGs/Imaging reviewed by ALONDRA Leach.     Medical Decision Making  History and exam consistent with otitis externa.  Will treat with cortisporin otic susp.  If no improvement follow up with PCP.  If worse, luis f. If EAC closes, surrounding redness, fevers, then to ER.      Orders and Diagnoses  There are no diagnoses linked to this encounter.    Medical Admin Record      Patient disposition: Home    Electronically signed by [facility]  4:48 PM      "

## 2024-09-26 ENCOUNTER — APPOINTMENT (OUTPATIENT)
Dept: DERMATOLOGY | Facility: CLINIC | Age: 69
End: 2024-09-26
Payer: MEDICARE

## 2024-09-26 DIAGNOSIS — L57.0 ACTINIC KERATOSIS: Primary | ICD-10-CM

## 2024-09-26 DIAGNOSIS — L81.4 LENTIGO: ICD-10-CM

## 2024-09-26 DIAGNOSIS — L57.8 DIFFUSE PHOTODAMAGE OF SKIN: ICD-10-CM

## 2024-09-26 DIAGNOSIS — L82.0 INFLAMED SEBORRHEIC KERATOSIS: ICD-10-CM

## 2024-09-26 DIAGNOSIS — Z87.2 HISTORY OF ACTINIC KERATOSES: ICD-10-CM

## 2024-09-26 DIAGNOSIS — L82.1 SEBORRHEIC KERATOSIS: ICD-10-CM

## 2024-09-26 ASSESSMENT — DERMATOLOGY PATIENT ASSESSMENT
ARE YOU TRYING TO GET PREGNANT: NO
DO YOU USE A TANNING BED: NO
DO YOU USE SUNSCREEN: OCCASIONALLY
DO YOU HAVE IRREGULAR MENSTRUAL CYCLES: NO
ARE YOU ON BIRTH CONTROL: NO
DO YOU HAVE ANY NEW OR CHANGING LESIONS: NO

## 2024-09-26 ASSESSMENT — DERMATOLOGY QUALITY OF LIFE (QOL) ASSESSMENT: ARE THERE EXCLUSIONS OR EXCEPTIONS FOR THE QUALITY OF LIFE ASSESSMENT: NO

## 2024-09-29 NOTE — PROGRESS NOTES
"Subjective     Lauren Rivers is a 69 y.o. female who presents for the following: Discuss recent biopsy result and treatment options.  Biopsy of a suspicious lesion on her left upper cheek performed at her last visit in our office on 9/3/24 revealed \"acanthosis, papillomatosis, and mild keratinocyte atypia,\" for which the findings favored an early seborrheic keratosis, and overlap with a pigmented actinic keratosis was in the differential diagnosis, and it was noted the atypia extends to the deep and peripheral margins.    Today, the patient states the biopsy site healed well.  She reports the area on her left upper cheek is still red, scaly, and itchy, but it has not changed in any other way, including in size, and it does not hurt or bleed.  She denies any new, changing, or concerning skin lesions since her last visit; no bleeding, itching, or burning lesions.      Review of Systems:  No other skin or systemic complaints other than what is documented elsewhere in the note.    The following portions of the chart were reviewed this encounter and updated as appropriate:       Skin Cancer History  No skin cancer on file.    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; her  has been seen in our office in the past as well, and they have 2 children    Allergies:  Oxycodone and Tramadol    Current Medications / CAM's:    Current Outpatient Medications:     amLODIPine (Norvasc) 10 mg tablet, Take 1 tablet (10 mg) by mouth once daily., Disp: , " Rfl:     aspirin 81 mg capsule, Take 81 mg by mouth once daily., Disp: , Rfl:     atenolol (Tenormin) 100 mg tablet, atenolol 100 mg tablet, Disp: , Rfl:     atorvastatin (Lipitor) 40 mg tablet, atorvastatin 40 mg tablet, Disp: , Rfl:     CALCIUM ACETATE ORAL, Take 500 mg by mouth twice a day., Disp: , Rfl:     cetirizine (ZyrTEC) 10 mg tablet, Take by mouth., Disp: , Rfl:     coenzyme Q-10 100 mg capsule, Take 1 capsule (100 mg) by mouth once daily., Disp: , Rfl:     diclofenac sodium 1 % kit, diclofenac 1 % topical gel, Disp: , Rfl:     ERGOCALCIFEROL, VITAMIN D2, IM, Take by mouth., Disp: , Rfl:     fenofibrate (Tricor) 145 mg tablet, fenofibrate nanocrystallized 145 mg tablet, Disp: , Rfl:     hydroCHLOROthiazide (HYDRODiuril) 25 mg tablet, hydrochlorothiazide 25 mg tablet, Disp: , Rfl:     ketoconazole (NIZOral) 2 % cream, Apply topically 2 times a day. To affected areas of feet for 3-4 weeks; repeat as needed for flares, Disp: 60 g, Rfl: 11    ketoconazole (NIZOral) 2 % shampoo, Wash affected areas of scalp 2-3 times weekly as directed, Disp: 120 mL, Rfl: 11    losartan (Cozaar) 100 mg tablet, Take by mouth., Disp: , Rfl:     metFORMIN (Glucophage) 500 mg tablet, Take 1 tablet (500 mg) by mouth once daily., Disp: 90 tablet, Rfl: 3    multivit-min-ferrous sulfate (One Daily Multi-Vit w-Mineral) 4.5 mg iron tablet, Take by mouth., Disp: , Rfl:     omega-3s-dha-epa-fish oil-D3 560 mg-1,680 mg -1,000 unit/5mL liquid, Take by mouth., Disp: , Rfl:      Objective   Well appearing patient in no apparent distress; mood and affect are within normal limits.    A skin examination was performed including the face and neck. All findings within normal limits unless otherwise noted below.    Assessment/Plan   1. Actinic keratosis (3)  Head - Anterior (Face) (3)  On the patient's left upper cheek, there is a pink, well-healed scar at the recent biopsy site with a surrounding rim of erythema, grittiness, and scale; scattered on  the patient's left lateral upper cheek, there are 2 erythematous and light brown, gritty, scaly macules    Biopsy-proven Pigmented Actinic Keratosis and additional AKs -left upper cheek, extending to the deep and peripheral margins, and scattered on left lateral upper cheek, respectively.  The pre-cancerous nature of these lesions and treatment options were discussed with the patient today.  At this time, I recommend treatment with liquid nitrogen cryotherapy.  The patient expressed understanding, is in agreement with this plan, and wishes to proceed with cryotherapy today.    Destr of lesion - Head - Anterior (Face) (3)  Complexity: simple    Destruction method: cryotherapy    Informed consent: discussed and consent obtained    Lesion destroyed using liquid nitrogen: Yes    Cryotherapy cycles:  1  Outcome: patient tolerated procedure well with no complications    Post-procedure details: wound care instructions given      2. Inflamed seborrheic keratosis  Head - Anterior (Face)  On the patient's left upper cheek, there is an 8 mm erythematous and light brown-colored, hyperkeratotic, stuck-on appearing papule with a surrounding rim of erythema and an adjacent pink, well-healed scar at her recent biopsy site    Inflamed Seborrheic Keratosis -left upper cheek.  The benign nature of this lesion was discussed with the patient today and reassurance provided.  Given the history the patient provides of frequent irritation and associated symptoms as well as its inflamed appearance on exam today, I offered to treat this lesion with liquid nitrogen cryotherapy.  The patient expressed understanding, is in agreement with this plan, and wishes to proceed with cryotherapy today.    Destr of lesion - Head - Anterior (Face)  Complexity: simple    Destruction method: cryotherapy    Informed consent: discussed and consent obtained    Lesion destroyed using liquid nitrogen: Yes    Cryotherapy cycles:  2  Outcome: patient tolerated  procedure well with no complications    Post-procedure details: wound care instructions given      3. Seborrheic keratosis  Head - Anterior (Face)  Scattered on the patient's face and neck, there are a few tan- to light brown-colored, hyperkeratotic, stuck-on appearing papules of varying size and shape    Seborrheic Keratoses - the benign nature of these lesions was discussed with the patient today and reassurance provided.  No treatment is medically indicated for the noninflamed SKs at this time.    4. Lentigo  Photodistributed  Multiple tan- to light brown-colored, round- to oval-shaped, symmetric and uniform-appearing macules and small patches consistent with lentigines scattered in sun-exposed areas.    Solar Lentigines and photodamage.  The clinically benign-appearing nature of these lesions and their relation to chronic sun exposure were discussed with the patient today and reassurance provided.  None of these lesions meet threshold for biopsy today, and thus no treatment is medically indicated for these lesions at this time.  The signs and symptoms of skin cancer were reviewed and the patient was advised to practice sun protection and sun avoidance, use daily sunscreen, and perform regular self skin exams.  The patient was instructed to monitor these lesions for any changes, such as in size, shape, or color, or associated symptoms and to call our office to schedule a return visit for re-evaluation if any such changes or symptoms are noticed in the future.  The patient expressed understanding and is in agreement with this plan.    5. History of actinic keratoses  There is evidence of photodamage in sun-exposed areas.    History of actinic keratoses and photodamage.  The signs and symptoms of skin cancer were reviewed and the patient was advised to practice sun protection and sun avoidance, use daily sunscreen, and perform regular self skin exams.  I will have the patient return to our office in 6 to 12 months  for routine follow-up and skin exam, and the patient was instructed to call our office should the patient notice any new, changing, symptomatic, or otherwise concerning skin lesions before then.  The patient expressed understanding and is in agreement with this plan.    6. Diffuse photodamage of skin  Photodistributed  Diffuse photodamage with actinic changes with telangiectasia and mottled pigmentation in sun-exposed areas.    Photodamage.  The signs and symptoms of skin cancer were reviewed and the patient was advised to practice sun protection and sun avoidance, use daily sunscreen, and perform regular self skin exams.  Sun protection was discussed, including avoiding the mid-day sun, wearing a sunscreen with SPF at least 50, and stressing the need for reapplication of sunscreen and applying more than they think they need.

## 2024-10-01 ENCOUNTER — OFFICE VISIT (OUTPATIENT)
Dept: SLEEP MEDICINE | Facility: CLINIC | Age: 69
End: 2024-10-01
Payer: MEDICARE

## 2024-10-01 VITALS
SYSTOLIC BLOOD PRESSURE: 136 MMHG | BODY MASS INDEX: 40.98 KG/M2 | HEIGHT: 66 IN | OXYGEN SATURATION: 95 % | WEIGHT: 255 LBS | TEMPERATURE: 96.7 F | RESPIRATION RATE: 16 BRPM | HEART RATE: 51 BPM | DIASTOLIC BLOOD PRESSURE: 81 MMHG

## 2024-10-01 DIAGNOSIS — G47.33 OSA ON CPAP: Primary | ICD-10-CM

## 2024-10-01 DIAGNOSIS — E66.01 MORBID OBESITY (MULTI): ICD-10-CM

## 2024-10-01 DIAGNOSIS — I10 BENIGN ESSENTIAL HYPERTENSION: ICD-10-CM

## 2024-10-01 PROCEDURE — 1036F TOBACCO NON-USER: CPT | Performed by: INTERNAL MEDICINE

## 2024-10-01 PROCEDURE — 3048F LDL-C <100 MG/DL: CPT | Performed by: INTERNAL MEDICINE

## 2024-10-01 PROCEDURE — G2211 COMPLEX E/M VISIT ADD ON: HCPCS | Performed by: INTERNAL MEDICINE

## 2024-10-01 PROCEDURE — 1159F MED LIST DOCD IN RCRD: CPT | Performed by: INTERNAL MEDICINE

## 2024-10-01 PROCEDURE — 99214 OFFICE O/P EST MOD 30 MIN: CPT | Performed by: INTERNAL MEDICINE

## 2024-10-01 PROCEDURE — 3060F POS MICROALBUMINURIA REV: CPT | Performed by: INTERNAL MEDICINE

## 2024-10-01 PROCEDURE — 1160F RVW MEDS BY RX/DR IN RCRD: CPT | Performed by: INTERNAL MEDICINE

## 2024-10-01 PROCEDURE — 4010F ACE/ARB THERAPY RXD/TAKEN: CPT | Performed by: INTERNAL MEDICINE

## 2024-10-01 PROCEDURE — 3008F BODY MASS INDEX DOCD: CPT | Performed by: INTERNAL MEDICINE

## 2024-10-01 PROCEDURE — 3079F DIAST BP 80-89 MM HG: CPT | Performed by: INTERNAL MEDICINE

## 2024-10-01 PROCEDURE — 3075F SYST BP GE 130 - 139MM HG: CPT | Performed by: INTERNAL MEDICINE

## 2024-10-01 PROCEDURE — 3044F HG A1C LEVEL LT 7.0%: CPT | Performed by: INTERNAL MEDICINE

## 2024-10-01 ASSESSMENT — SLEEP AND FATIGUE QUESTIONNAIRES
SITING INACTIVE IN A PUBLIC PLACE LIKE A CLASS ROOM OR A MOVIE THEATER: MODERATE CHANCE OF DOZING
HOW LIKELY ARE YOU TO NOD OFF OR FALL ASLEEP WHILE WATCHING TV: MODERATE CHANCE OF DOZING
ESS-CHAD TOTAL SCORE: 14
HOW LIKELY ARE YOU TO NOD OFF OR FALL ASLEEP WHILE SITTING AND TALKING TO SOMEONE: WOULD NEVER DOZE
HOW LIKELY ARE YOU TO NOD OFF OR FALL ASLEEP IN A CAR, WHILE STOPPED FOR A FEW MINUTES IN TRAFFIC: WOULD NEVER DOZE
HOW LIKELY ARE YOU TO NOD OFF OR FALL ASLEEP WHILE LYING DOWN TO REST IN THE AFTERNOON WHEN CIRCUMSTANCES PERMIT: HIGH CHANCE OF DOZING
HOW LIKELY ARE YOU TO NOD OFF OR FALL ASLEEP WHILE SITTING QUIETLY AFTER LUNCH WITHOUT ALCOHOL: SLIGHT CHANCE OF DOZING
HOW LIKELY ARE YOU TO NOD OFF OR FALL ASLEEP WHEN YOU ARE A PASSENGER IN A CAR FOR AN HOUR WITHOUT A BREAK: HIGH CHANCE OF DOZING
HOW LIKELY ARE YOU TO NOD OFF OR FALL ASLEEP WHILE SITTING AND READING: HIGH CHANCE OF DOZING

## 2024-10-02 ENCOUNTER — OFFICE VISIT (OUTPATIENT)
Dept: URGENT CARE | Age: 69
End: 2024-10-02
Payer: MEDICARE

## 2024-10-02 VITALS — TEMPERATURE: 97.9 F | OXYGEN SATURATION: 96 % | HEART RATE: 57 BPM

## 2024-10-02 DIAGNOSIS — H61.22 IMPACTED CERUMEN OF LEFT EAR: Primary | ICD-10-CM

## 2024-10-02 ASSESSMENT — ENCOUNTER SYMPTOMS
CONSTITUTIONAL NEGATIVE: 1
RHINORRHEA: 1
SINUS PAIN: 0
EYES NEGATIVE: 1
SINUS PRESSURE: 0
CARDIOVASCULAR NEGATIVE: 1
RESPIRATORY NEGATIVE: 1
SORE THROAT: 1
GASTROINTESTINAL NEGATIVE: 1

## 2024-10-02 NOTE — PROGRESS NOTES
Subjective   Patient ID: Lauren Rivers is a 69 y.o. female. They present today with a chief complaint of left ear blocked.    History of Present Illness  Patient presents with a continued blocked left ear.  She did apply ear drops but states it still feels blocked.  She was dx with Otitis Externa at last visit.      Past Medical History  Allergies as of 10/02/2024 - Reviewed 10/02/2024   Allergen Reaction Noted    Oxycodone Other 04/27/2023    Tramadol Other 04/27/2023       (Not in a hospital admission)       Past Medical History:   Diagnosis Date    Achilles tendinitis, right leg 11/18/2019    Achilles bursitis of right lower extremity    Atherosclerotic heart disease of native coronary artery without angina pectoris 03/31/2022    CAD (coronary artery disease)    Dorsalgia, unspecified 07/30/2021    Right-sided back pain    Encounter for general adult medical examination without abnormal findings 12/14/2020    Medicare welcome exam    Essential (primary) hypertension 12/12/2022    Benign essential hypertension    Metabolic syndrome 10/11/2022    Metabolic syndrome    Other chest pain 09/15/2020    Pain, chest wall    Other malaise 09/11/2019    Debility    Other specified disorders of synovium and tendon, other site 01/10/2020    Achilles tendinosis of right lower extremity    Pain in right ankle and joints of right foot 03/05/2020    Right ankle pain    Pain in right knee     Right knee pain, unspecified chronicity    Pain in unspecified ankle and joints of unspecified foot 11/14/2019    Ankle joint pain    Personal history of diseases of the skin and subcutaneous tissue 12/10/2019    History of seborrheic dermatitis    Personal history of other diseases of the musculoskeletal system and connective tissue 12/18/2017    History of lateral epicondylitis of left elbow    Personal history of other diseases of the nervous system and sense organs 01/31/2021    History of sleep apnea    Personal history of other  diseases of the nervous system and sense organs 10/12/2015    History of sleep apnea    Personal history of other diseases of urinary system 04/30/2021    History of stress incontinence    Personal history of other endocrine, nutritional and metabolic disease 12/15/2017    History of obesity    Personal history of other infectious and parasitic diseases 04/16/2014    History of onychomycosis    Personal history of other mental and behavioral disorders 03/08/2021    History of anxiety    Personal history of other specified conditions 04/26/2016    History of balance disorder    Personal history of other specified conditions 05/25/2022    History of abdominal pain    Personal history of other specified conditions 07/29/2021    History of flank pain    Personal history of other specified conditions 05/05/2020    History of hoarseness    Personal history of other specified conditions 04/29/2016    History of balance disorder    Rash and other nonspecific skin eruption 12/24/2020    Rash    Unspecified inflammation of eyelid 01/26/2021    Dermatitis of eyelid       Past Surgical History:   Procedure Laterality Date    CORONARY ARTERY BYPASS GRAFT  04/26/2016    CABG    WRIST FRACTURE SURGERY Left         reports that she has never smoked. She has never used smokeless tobacco. She reports that she does not drink alcohol and does not use drugs.    Review of Systems  Review of Systems   Constitutional: Negative.    HENT:  Positive for congestion, ear pain, postnasal drip, rhinorrhea and sore throat. Negative for ear discharge, sinus pressure and sinus pain.    Eyes: Negative.    Respiratory: Negative.     Cardiovascular: Negative.    Gastrointestinal: Negative.                                     Objective    Vitals:    10/02/24 1733   Pulse: 57   Temp: 36.6 °C (97.9 °F)   SpO2: 96%     No LMP recorded. Patient is postmenopausal.    Physical Exam  Constitutional:       Appearance: Normal appearance.   HENT:      Head:  Normocephalic and atraumatic.      Left Ear: Cerumen Impaction.  Cardiovascular:      Rate and Rhythm: Normal rate and regular rhythm.      Pulses: Normal pulses.      Heart sounds: Normal heart sounds.   Pulmonary:      Effort: Pulmonary effort is normal.      Breath sounds: Normal breath sounds.   Abdominal:      General: Abdomen is flat. Bowel sounds are normal.      Palpations: Abdomen is soft.   Neurological:      Mental Status: Patient is alert.     Ear Cerumen Removal    Date/Time: 10/2/2024 6:21 PM    Performed by: CORNELIUS Song  Authorized by: CORNELIUS Song    Consent:     Consent obtained:  Verbal    Consent given by:  Patient    Risks discussed:  Incomplete removal, dizziness, pain and TM perforation  Universal protocol:     Patient identity confirmed:  Verbally with patient  Procedure details:     Location:  L ear    Procedure type: irrigation      Procedure outcomes: cerumen removed    Post-procedure details:     Inspection:  No bleeding and TM intact    Hearing quality:  Improved    Procedure completion:  Tolerated      Point of Care Test & Imaging Results from this visit  No results found for this visit on 10/02/24.   No results found.    Diagnostic study results (if any) were reviewed by [facility].    Assessment/Plan   Allergies, medications, history, and pertinent labs/EKGs/Imaging reviewed by CORNELIUS Song.     Medical Decision Making  At time of discharge patient was clinically well-appearing and HDS for outpatient management. The patient and/or family was educated regarding diagnosis, supportive care, OTC and Rx medications. The patient and/or family was given the opportunity to ask questions prior to discharge.  They verbalized understanding of my discussion of the plans for treatment, expected course, indications to return to  or seek further evaluation in ED, and the need for timely follow up as directed.   They were provided with a work/school  excuse if requested.      Orders and Diagnoses  There are no diagnoses linked to this encounter.    Medical Admin Record      Patient disposition: Home    Electronically signed by [facility]  5:41 PM

## 2024-10-15 ENCOUNTER — OFFICE VISIT (OUTPATIENT)
Dept: PRIMARY CARE | Facility: CLINIC | Age: 69
End: 2024-10-15
Payer: MEDICARE

## 2024-10-15 VITALS
HEIGHT: 64 IN | TEMPERATURE: 98.2 F | SYSTOLIC BLOOD PRESSURE: 119 MMHG | OXYGEN SATURATION: 94 % | BODY MASS INDEX: 43.36 KG/M2 | DIASTOLIC BLOOD PRESSURE: 71 MMHG | HEART RATE: 61 BPM | WEIGHT: 254 LBS

## 2024-10-15 DIAGNOSIS — H60.312 ACUTE DIFFUSE OTITIS EXTERNA OF LEFT EAR: Primary | ICD-10-CM

## 2024-10-15 DIAGNOSIS — H61.22 IMPACTED CERUMEN OF LEFT EAR: ICD-10-CM

## 2024-10-15 DIAGNOSIS — R09.82 POST-NASAL DISCHARGE: ICD-10-CM

## 2024-10-15 DIAGNOSIS — M79.641 PAIN OF RIGHT HAND: ICD-10-CM

## 2024-10-15 DIAGNOSIS — M17.0 PRIMARY OSTEOARTHRITIS OF BOTH KNEES: ICD-10-CM

## 2024-10-15 PROBLEM — K76.89 HEPATIC DYSFUNCTION: Status: ACTIVE | Noted: 2024-10-15

## 2024-10-15 PROCEDURE — 99214 OFFICE O/P EST MOD 30 MIN: CPT | Performed by: INTERNAL MEDICINE

## 2024-10-15 PROCEDURE — 3048F LDL-C <100 MG/DL: CPT | Performed by: INTERNAL MEDICINE

## 2024-10-15 PROCEDURE — 3060F POS MICROALBUMINURIA REV: CPT | Performed by: INTERNAL MEDICINE

## 2024-10-15 PROCEDURE — 3078F DIAST BP <80 MM HG: CPT | Performed by: INTERNAL MEDICINE

## 2024-10-15 PROCEDURE — 3074F SYST BP LT 130 MM HG: CPT | Performed by: INTERNAL MEDICINE

## 2024-10-15 PROCEDURE — 3044F HG A1C LEVEL LT 7.0%: CPT | Performed by: INTERNAL MEDICINE

## 2024-10-15 PROCEDURE — 1159F MED LIST DOCD IN RCRD: CPT | Performed by: INTERNAL MEDICINE

## 2024-10-15 PROCEDURE — 69209 REMOVE IMPACTED EAR WAX UNI: CPT | Performed by: INTERNAL MEDICINE

## 2024-10-15 PROCEDURE — 1036F TOBACCO NON-USER: CPT | Performed by: INTERNAL MEDICINE

## 2024-10-15 PROCEDURE — 3008F BODY MASS INDEX DOCD: CPT | Performed by: INTERNAL MEDICINE

## 2024-10-15 PROCEDURE — 4010F ACE/ARB THERAPY RXD/TAKEN: CPT | Performed by: INTERNAL MEDICINE

## 2024-10-15 RX ORDER — NEOMYCIN SULFATE, POLYMYXIN B SULFATE, HYDROCORTISONE 3.5; 10000; 1 MG/ML; [USP'U]/ML; MG/ML
3 SOLUTION/ DROPS AURICULAR (OTIC) 4 TIMES DAILY
Qty: 10 ML | Refills: 0 | Status: SHIPPED | OUTPATIENT
Start: 2024-10-15 | End: 2024-10-25

## 2024-10-15 NOTE — PROGRESS NOTES
"PCP: Wendy Bright MD   I have reviewed existing histories, notes, past medical history, surgical history, social history, family history, med list, allergy list, and immunization, and updated.    HPI:   Here mainly for left ear, also other issues:    For 3 weeks left ear feels plugged with pain.  Went to urgent care, given cortisporin, which helped a bit but ear still plugged.   She went back to urgent care, and her left ear was irrigated.   She continued to feel plugged. Also pain.    She also has had inside of neck pain, feels lot of Post nasal drainage, has nasal discharge. When she brushes teeth, usually spits out some yellow mucus.  She said she ENT years ago for this, was told to take med for GERD. Did not help.  She is not currently taking flnoase    She saw hand doctor last year, for right thumb wrist area pain. It is still painful. We looked at the record together, and found the doctor's name, and pt was advised to Follow up with that hand doctor.    She also has had lot of pain in both knees. Has been an ortho doctor a few years ago, had cortisone injection. Would like to go back to them. But we could not find the names of the doctor or any visits for that. So will place new referral      Lab Results   Component Value Date    WBC 6.1 04/23/2024    HGB 14.1 04/23/2024    HCT 44.2 04/23/2024     04/23/2024    CHOL 125 04/23/2024    TRIG 231 (H) 04/23/2024    HDL 44.0 04/23/2024    ALT 21 04/23/2024    AST 22 04/23/2024     04/23/2024    K 4.2 04/23/2024     04/23/2024    CREATININE 0.95 04/23/2024    BUN 21 04/23/2024    CO2 31 04/23/2024    HGBA1C 5.6 04/23/2024     Physical exam:  /71   Pulse 61   Temp 36.8 °C (98.2 °F)   Ht 1.626 m (5' 4\")   Wt 115 kg (254 lb)   SpO2 94%   BMI 43.60 kg/m²    left ear was filled with dark cerumen. We were able to irrigated this all out. After procedure, exam showed Otitis externa, with canal irritation, swelling, erythema.   No mastoid " tenderness  Throat normal   No Neck gland swelling      Assessments/orders:   Assessment & Plan  Acute diffuse otitis externa of left ear    Orders:  •  neomycin-polymyxin-HC (Cortisporin) otic solution; Administer 3 drops into the left ear 4 times a day for 10 days.    Impacted cerumen of left ear         Primary osteoarthritis of both knees    Orders:  •  Referral to Orthopaedic Surgery; Future    Post-nasal discharge            Patient ID: Lauren Rivers is a 69 y.o. female.    Ear Cerumen Removal    Date/Time: 10/15/2024 2:31 PM    Performed by: Wendy Bright MD  Authorized by: Wendy Bright MD    Consent:     Consent obtained:  Verbal    Consent given by:  Patient  Procedure details:     Location:  L ear    Procedure type: irrigation      Procedure outcomes: cerumen removed    Post-procedure details:     Inspection:  TM intact    Hearing quality:  Improved    Procedure completion:  Tolerated

## 2024-10-21 ENCOUNTER — TELEPHONE (OUTPATIENT)
Dept: PRIMARY CARE | Facility: CLINIC | Age: 69
End: 2024-10-21
Payer: MEDICARE

## 2024-10-21 DIAGNOSIS — H60.312 ACUTE DIFFUSE OTITIS EXTERNA OF LEFT EAR: Primary | ICD-10-CM

## 2024-10-21 RX ORDER — LEVOFLOXACIN 500 MG/1
500 TABLET, FILM COATED ORAL DAILY
Qty: 7 TABLET | Refills: 0 | Status: SHIPPED | OUTPATIENT
Start: 2024-10-21 | End: 2024-10-28

## 2024-10-21 NOTE — TELEPHONE ENCOUNTER
I have sent a rx for Levaquin, take one a day. ENT referral placed, in case pt needs to. Continue the ear drop

## 2024-10-21 NOTE — TELEPHONE ENCOUNTER
PT STATES THAT HER EAR IS BLOCKED AGAIN AND SHE STATED THAT THE EAR DROPS ARE IRRITATING HER EAR NOW AND THE IBP IS NOT WORKING

## 2024-10-29 ENCOUNTER — OFFICE VISIT (OUTPATIENT)
Dept: ORTHOPEDIC SURGERY | Facility: CLINIC | Age: 69
End: 2024-10-29
Payer: MEDICARE

## 2024-10-29 ENCOUNTER — HOSPITAL ENCOUNTER (OUTPATIENT)
Dept: RADIOLOGY | Facility: CLINIC | Age: 69
Discharge: HOME | End: 2024-10-29
Payer: MEDICARE

## 2024-10-29 DIAGNOSIS — M25.561 PAIN IN BOTH KNEES, UNSPECIFIED CHRONICITY: ICD-10-CM

## 2024-10-29 DIAGNOSIS — M25.562 PAIN IN BOTH KNEES, UNSPECIFIED CHRONICITY: ICD-10-CM

## 2024-10-29 PROCEDURE — 99204 OFFICE O/P NEW MOD 45 MIN: CPT | Performed by: ORTHOPAEDIC SURGERY

## 2024-10-29 PROCEDURE — 1159F MED LIST DOCD IN RCRD: CPT | Performed by: ORTHOPAEDIC SURGERY

## 2024-10-29 PROCEDURE — 3048F LDL-C <100 MG/DL: CPT | Performed by: ORTHOPAEDIC SURGERY

## 2024-10-29 PROCEDURE — 3060F POS MICROALBUMINURIA REV: CPT | Performed by: ORTHOPAEDIC SURGERY

## 2024-10-29 PROCEDURE — 3044F HG A1C LEVEL LT 7.0%: CPT | Performed by: ORTHOPAEDIC SURGERY

## 2024-10-29 PROCEDURE — 4010F ACE/ARB THERAPY RXD/TAKEN: CPT | Performed by: ORTHOPAEDIC SURGERY

## 2024-10-29 PROCEDURE — 73560 X-RAY EXAM OF KNEE 1 OR 2: CPT | Mod: 50

## 2024-10-29 PROCEDURE — 73560 X-RAY EXAM OF KNEE 1 OR 2: CPT | Mod: BILATERAL PROCEDURE | Performed by: RADIOLOGY

## 2024-10-29 PROCEDURE — 99214 OFFICE O/P EST MOD 30 MIN: CPT | Performed by: ORTHOPAEDIC SURGERY

## 2024-11-04 DIAGNOSIS — M25.561 PAIN IN BOTH KNEES, UNSPECIFIED CHRONICITY: Primary | ICD-10-CM

## 2024-11-04 DIAGNOSIS — M25.562 PAIN IN BOTH KNEES, UNSPECIFIED CHRONICITY: Primary | ICD-10-CM

## 2024-11-04 RX ORDER — MELOXICAM 15 MG/1
15 TABLET ORAL DAILY
Qty: 60 TABLET | Refills: 0 | Status: SHIPPED | OUTPATIENT
Start: 2024-11-04 | End: 2025-01-03

## 2024-11-07 RX ORDER — MELOXICAM 15 MG/1
15 TABLET ORAL DAILY
Qty: 30 TABLET | Refills: 11 | Status: SHIPPED | OUTPATIENT
Start: 2024-11-07 | End: 2025-11-07

## 2024-11-12 ENCOUNTER — APPOINTMENT (OUTPATIENT)
Dept: ORTHOPEDIC SURGERY | Facility: CLINIC | Age: 69
End: 2024-11-12
Payer: MEDICARE

## 2024-11-19 ENCOUNTER — HOSPITAL ENCOUNTER (OUTPATIENT)
Dept: RADIOLOGY | Facility: CLINIC | Age: 69
End: 2024-11-19
Payer: MEDICARE

## 2024-11-26 ENCOUNTER — OFFICE VISIT (OUTPATIENT)
Dept: ORTHOPEDIC SURGERY | Facility: CLINIC | Age: 69
End: 2024-11-26
Payer: MEDICARE

## 2024-11-26 DIAGNOSIS — M18.11 LOCALIZED PRIMARY OSTEOARTHRITIS OF CARPOMETACARPAL JOINT OF RIGHT THUMB: Primary | ICD-10-CM

## 2024-11-26 PROCEDURE — 1036F TOBACCO NON-USER: CPT | Performed by: ORTHOPAEDIC SURGERY

## 2024-11-26 PROCEDURE — 1159F MED LIST DOCD IN RCRD: CPT | Performed by: ORTHOPAEDIC SURGERY

## 2024-11-26 PROCEDURE — 3044F HG A1C LEVEL LT 7.0%: CPT | Performed by: ORTHOPAEDIC SURGERY

## 2024-11-26 PROCEDURE — 1160F RVW MEDS BY RX/DR IN RCRD: CPT | Performed by: ORTHOPAEDIC SURGERY

## 2024-11-26 PROCEDURE — 99213 OFFICE O/P EST LOW 20 MIN: CPT | Performed by: ORTHOPAEDIC SURGERY

## 2024-11-26 PROCEDURE — 4010F ACE/ARB THERAPY RXD/TAKEN: CPT | Performed by: ORTHOPAEDIC SURGERY

## 2024-11-26 PROCEDURE — 20600 DRAIN/INJ JOINT/BURSA W/O US: CPT | Mod: RT | Performed by: ORTHOPAEDIC SURGERY

## 2024-11-26 PROCEDURE — 3060F POS MICROALBUMINURIA REV: CPT | Performed by: ORTHOPAEDIC SURGERY

## 2024-11-26 PROCEDURE — 2500000004 HC RX 250 GENERAL PHARMACY W/ HCPCS (ALT 636 FOR OP/ED): Performed by: ORTHOPAEDIC SURGERY

## 2024-11-26 PROCEDURE — 3048F LDL-C <100 MG/DL: CPT | Performed by: ORTHOPAEDIC SURGERY

## 2024-11-26 NOTE — LETTER
November 27, 2024     Wendy Bright MD  [address]  [place] [facility] [address]    Patient: Lauren Rivers   YOB: 1955   Date of Visit: 11/26/2024       Dear Dr. Wendy Bright MD:    Thank you for referring Lauren Rivers to me for evaluation. Below are my notes for this consultation.  If you have questions, please do not hesitate to call me. I look forward to following your patient along with you.       Sincerely,     Jesús Eubanks MD      CC: No Recipients  ______________________________________________________________________________________    CHIEF COMPLAINT         Right thumb pain    ASSESSMENT + PLAN    Right CMC osteoarthritis, progressive despite splinting    The nature of basal joint arthritis was reviewed, along with the natural history and expected waxing and waning course.  I reviewed the options for management, including observation, nonsteroidals, activity modification, splinting, oral steroids, cortisone injection, or surgical joint reconstruction, along with the likely success rates of each.  I reviewed the risks of cortisone injection, including infection, hypopigmentation, and fat atrophy, along with the expected diminishing returns from repeat injections.  The transient effect on blood glucose measurement was also reviewed, and the patient wished to proceed.    After sterile prep, I injected 20 mg of Kenalog and 0.5 cc of 1% lidocaine, plain to the right first carpometacarpal joint from a dorsal approach.    Patient will continue with splints and nonsteroidals as needed and followup in 4 weeks if still symptomatic, or with any concerns.      HISTORY OF PRESENT ILLNESS       Patient returns today, for follow-up of right CMC joint arthritis.  She was last seen approximately 18 months ago.  At that time, she elected for noninvasive management of this condition.  She has been using a thumb spica splint and has adapted some equipment to have larger  handles.  No interval trauma.  No numbness or tingling.    In the interim, she has had worsening pain at the base of her right thumb.  She remains retired.  She is right-handed.  She has difficulty with opening jars and other tasks around the house.  Pain is at the base of the thumb and is sharper on bad days.  It does not radiate.  No popping, clicking, or subjective instability.      PHYSICAL EXAM    She remains well-developed, well-nourished morbidly obese female in no acute distress.  She appears her stated age and has a pleasant affect.  Skin of the right hand and wrist remains intact with no erythema, ecchymosis, or diffuse swelling.  Moderate CMC shoulder sign.  Tender at CMC, but not STT.  No MP hyperextension collapse.  Good IP flexion extension pull-through with no triggering.  Positive CMC grind, reproducing chief complaint.  Negative distraction and dorothy totter test.  Negative Gross.  Negative midcarpal shift.  Sensation intact to light touch in all distributions.  Capillary refill less than 2 seconds.  2+ radial and ulnar pulses.    IMAGING / LABS / EMGs    None pertinent      Electronically Signed     Radames Araujo MD       PROCEDURE    S Inj/Asp: R thumb CMC on 11/26/2024 8:34 AM  Indications: pain  Details: 25 G needle, dorsal approach  Medications: 20 mg triamcinolone acetonide 40 mg/mL; 0.5 mL lidocaine 10 mg/mL (1 %)  Outcome: tolerated well, no immediate complications  Procedure, treatment alternatives, risks and benefits explained, specific risks discussed. Consent was given by the patient.             ATTESTATION    I saw and evaluated the patient. I personally obtained the key and critical portions of the history and physical exam or was physically present for key and critical portions performed by the resident/fellow. I reviewed the resident/fellow's documentation and discussed the patient with the resident/fellow. I agree with the resident/fellow's medical decision making as documented in  the note.        Electronically signed  EUNICE Eubanks MD  [phone]

## 2024-11-26 NOTE — PROGRESS NOTES
CHIEF COMPLAINT         Right thumb pain    ASSESSMENT + PLAN    Right CMC osteoarthritis, progressive despite splinting    The nature of basal joint arthritis was reviewed, along with the natural history and expected waxing and waning course.  I reviewed the options for management, including observation, nonsteroidals, activity modification, splinting, oral steroids, cortisone injection, or surgical joint reconstruction, along with the likely success rates of each.  I reviewed the risks of cortisone injection, including infection, hypopigmentation, and fat atrophy, along with the expected diminishing returns from repeat injections.  The transient effect on blood glucose measurement was also reviewed, and the patient wished to proceed.    After sterile prep, I injected 20 mg of Kenalog and 0.5 cc of 1% lidocaine, plain to the right first carpometacarpal joint from a dorsal approach.    Patient will continue with splints and nonsteroidals as needed and followup in 4 weeks if still symptomatic, or with any concerns.      HISTORY OF PRESENT ILLNESS       Patient returns today, for follow-up of right CMC joint arthritis.  She was last seen approximately 18 months ago.  At that time, she elected for noninvasive management of this condition.  She has been using a thumb spica splint and has adapted some equipment to have larger handles.  No interval trauma.  No numbness or tingling.    In the interim, she has had worsening pain at the base of her right thumb.  She remains retired.  She is right-handed.  She has difficulty with opening jars and other tasks around the house.  Pain is at the base of the thumb and is sharper on bad days.  It does not radiate.  No popping, clicking, or subjective instability.      PHYSICAL EXAM    She remains well-developed, well-nourished morbidly obese female in no acute distress.  She appears her stated age and has a pleasant affect.  Skin of the right hand and wrist remains intact with no  erythema, ecchymosis, or diffuse swelling.  Moderate CMC shoulder sign.  Tender at CMC, but not STT.  No MP hyperextension collapse.  Good IP flexion extension pull-through with no triggering.  Positive CMC grind, reproducing chief complaint.  Negative distraction and dorothy totter test.  Negative Gross.  Negative midcarpal shift.  Sensation intact to light touch in all distributions.  Capillary refill less than 2 seconds.  2+ radial and ulnar pulses.    IMAGING / LABS / EMGs    None pertinent      Electronically Signed     Radames Araujo MD       PROCEDURE    S Inj/Asp: R thumb CMC on 11/26/2024 8:34 AM  Indications: pain  Details: 25 G needle, dorsal approach  Medications: 20 mg triamcinolone acetonide 40 mg/mL; 0.5 mL lidocaine 10 mg/mL (1 %)  Outcome: tolerated well, no immediate complications  Procedure, treatment alternatives, risks and benefits explained, specific risks discussed. Consent was given by the patient.             ATTESTATION    I saw and evaluated the patient. I personally obtained the key and critical portions of the history and physical exam or was physically present for key and critical portions performed by the resident/fellow. I reviewed the resident/fellow's documentation and discussed the patient with the resident/fellow. I agree with the resident/fellow's medical decision making as documented in the note.        Electronically signed  EUNICE Eubanks MD  [phone]

## 2024-11-27 RX ORDER — LIDOCAINE HYDROCHLORIDE 10 MG/ML
0.5 INJECTION, SOLUTION INFILTRATION; PERINEURAL
Status: COMPLETED | OUTPATIENT
Start: 2024-11-26 | End: 2024-11-26

## 2024-11-27 RX ORDER — TRIAMCINOLONE ACETONIDE 40 MG/ML
20 INJECTION, SUSPENSION INTRA-ARTICULAR; INTRAMUSCULAR
Status: COMPLETED | OUTPATIENT
Start: 2024-11-26 | End: 2024-11-26

## 2024-11-29 ENCOUNTER — OFFICE VISIT (OUTPATIENT)
Dept: URGENT CARE | Age: 69
End: 2024-11-29
Payer: MEDICARE

## 2024-11-29 ENCOUNTER — HOSPITAL ENCOUNTER (OUTPATIENT)
Dept: RADIOLOGY | Facility: CLINIC | Age: 69
Discharge: HOME | End: 2024-11-29
Payer: MEDICARE

## 2024-11-29 VITALS
SYSTOLIC BLOOD PRESSURE: 157 MMHG | HEART RATE: 59 BPM | RESPIRATION RATE: 16 BRPM | OXYGEN SATURATION: 98 % | DIASTOLIC BLOOD PRESSURE: 70 MMHG

## 2024-11-29 DIAGNOSIS — H60.502 ACUTE OTITIS EXTERNA OF LEFT EAR, UNSPECIFIED TYPE: Primary | ICD-10-CM

## 2024-11-29 DIAGNOSIS — R92.8 ABNORMAL MAMMOGRAM: ICD-10-CM

## 2024-11-29 PROCEDURE — 76642 ULTRASOUND BREAST LIMITED: CPT | Mod: LT

## 2024-11-29 PROCEDURE — 76982 USE 1ST TARGET LESION: CPT | Mod: 50,LT

## 2024-11-29 RX ORDER — OFLOXACIN 3 MG/ML
10 SOLUTION AURICULAR (OTIC) DAILY
Qty: 5 ML | Refills: 0 | Status: SHIPPED | OUTPATIENT
Start: 2024-11-29 | End: 2024-12-09

## 2024-11-29 RX ORDER — AMOXICILLIN AND CLAVULANATE POTASSIUM 875; 125 MG/1; MG/1
1 TABLET, FILM COATED ORAL 2 TIMES DAILY
Qty: 14 TABLET | Refills: 0 | Status: SHIPPED | OUTPATIENT
Start: 2024-11-29 | End: 2024-12-06

## 2024-11-29 NOTE — PROGRESS NOTES
"Subjective   History of Present Illness: Lauren Rivers is a 69 y.o. female. They present today with a chief complaint of Earache (Pt c/o persistent left ear \"fullness\" and discomfort refractory to treatment x2 months).      Past Medical History  Allergies as of 11/29/2024 - Reviewed 11/29/2024   Allergen Reaction Noted    Oxycodone Other 04/27/2023    Tramadol Other 04/27/2023       (Not in a hospital admission)       Past Medical History:   Diagnosis Date    Achilles tendinitis, right leg 11/18/2019    Achilles bursitis of right lower extremity    Atherosclerotic heart disease of native coronary artery without angina pectoris 03/31/2022    CAD (coronary artery disease)    Dorsalgia, unspecified 07/30/2021    Right-sided back pain    Encounter for general adult medical examination without abnormal findings 12/14/2020    Medicare welcome exam    Essential (primary) hypertension 12/12/2022    Benign essential hypertension    Metabolic syndrome 10/11/2022    Metabolic syndrome    Other chest pain 09/15/2020    Pain, chest wall    Other malaise 09/11/2019    Debility    Other specified disorders of synovium and tendon, other site 01/10/2020    Achilles tendinosis of right lower extremity    Pain in right ankle and joints of right foot 03/05/2020    Right ankle pain    Pain in right knee     Right knee pain, unspecified chronicity    Pain in unspecified ankle and joints of unspecified foot 11/14/2019    Ankle joint pain    Personal history of diseases of the skin and subcutaneous tissue 12/10/2019    History of seborrheic dermatitis    Personal history of other diseases of the musculoskeletal system and connective tissue 12/18/2017    History of lateral epicondylitis of left elbow    Personal history of other diseases of the nervous system and sense organs 01/31/2021    History of sleep apnea    Personal history of other diseases of the nervous system and sense organs 10/12/2015    History of sleep apnea    Personal " history of other diseases of urinary system 04/30/2021    History of stress incontinence    Personal history of other endocrine, nutritional and metabolic disease 12/15/2017    History of obesity    Personal history of other infectious and parasitic diseases 04/16/2014    History of onychomycosis    Personal history of other mental and behavioral disorders 03/08/2021    History of anxiety    Personal history of other specified conditions 04/26/2016    History of balance disorder    Personal history of other specified conditions 05/25/2022    History of abdominal pain    Personal history of other specified conditions 07/29/2021    History of flank pain    Personal history of other specified conditions 05/05/2020    History of hoarseness    Personal history of other specified conditions 04/29/2016    History of balance disorder    Rash and other nonspecific skin eruption 12/24/2020    Rash    Unspecified inflammation of eyelid 01/26/2021    Dermatitis of eyelid       Past Surgical History:   Procedure Laterality Date    CORONARY ARTERY BYPASS GRAFT  04/26/2016    [facility]    WRIST FRACTURE SURGERY Left         reports that she has never smoked. She has never used smokeless tobacco. She reports that she does not drink alcohol and does not use drugs.    Review of Systems  Review of Systems                               Objective    Vitals:    11/29/24 1742   BP: 157/70   Pulse: 59   Resp: 16   SpO2: 98%     No LMP recorded. Patient is postmenopausal.    Physical Exam  Vitals reviewed.   HENT:      Head: Normocephalic and atraumatic.      Right Ear: Ear canal and external ear normal.      Left Ear: Drainage, swelling and tenderness present.      Ears:      Comments: Unable to visualize L. TM     Nose: Nose normal.      Mouth/Throat:      Mouth: Mucous membranes are moist.   Eyes:      Extraocular Movements: Extraocular movements intact.      Conjunctiva/sclera: Conjunctivae normal.      Pupils: Pupils are equal, round, and  reactive to light.   Cardiovascular:      Rate and Rhythm: Normal rate and regular rhythm.   Pulmonary:      Effort: Pulmonary effort is normal.      Breath sounds: Normal breath sounds.   Skin:     General: Skin is warm.   Neurological:      Mental Status: She is alert and oriented to person, place, and time.   Psychiatric:         Mood and Affect: Mood normal.         Behavior: Behavior normal.             Point of Care Test & Imaging Results from this visit  No results found for this visit on 11/29/24.     Diagnostic study results (if any) were reviewed by Eleuterio Maldonado PA-C.    Assessment/Plan   Allergies, medications, history, and pertinent labs/EKGs/Imaging reviewed by Eleuterio Maldonado PA-C.     Medical Decision Making  -         Patient is educated about their diagnoses.     -          Discussed medications benefits and adverse effects.     -          Answered all patient’s questions.     -          Patient will call 911 or go to the nearest ED if worsen symptoms .     -          Patient is agreeable to the plan of care and is deemed stable upon discharge.     -          Follow up with your primary care provider in two days.      Orders and Diagnoses  Diagnoses and all orders for this visit:  Acute otitis externa of left ear, unspecified type  -     ofloxacin (Floxin) 0.3 % otic solution; Administer 10 drops into the left ear once daily for 10 days.  -     amoxicillin-pot clavulanate (Augmentin) 875-125 mg tablet; Take 1 tablet by mouth 2 times a day for 7 days.      Medical Admin Record      Patient disposition: Home    Electronically signed by Eleuterio Maldonado PA-C  5:57 PM

## 2024-11-30 ENCOUNTER — TELEPHONE (OUTPATIENT)
Dept: PRIMARY CARE | Facility: CLINIC | Age: 69
End: 2024-11-30
Payer: MEDICARE

## 2024-11-30 NOTE — TELEPHONE ENCOUNTER
Pt states that she have an ear ache for over a month and her hear is swollen. She have an appt Monday but she would like to know if you could send her something for right now. She would like to know if you could send it to [facility] in [place]

## 2024-12-02 ENCOUNTER — APPOINTMENT (OUTPATIENT)
Dept: OTOLARYNGOLOGY | Facility: CLINIC | Age: 69
End: 2024-12-02
Payer: MEDICARE

## 2024-12-02 VITALS — BODY MASS INDEX: 42.91 KG/M2 | WEIGHT: 250 LBS

## 2024-12-02 DIAGNOSIS — R05.3 CHRONIC COUGH: ICD-10-CM

## 2024-12-02 DIAGNOSIS — K21.9 LARYNGOPHARYNGEAL REFLUX (LPR): ICD-10-CM

## 2024-12-02 DIAGNOSIS — H66.012 ACUTE SUPPURATIVE OTITIS MEDIA OF LEFT EAR WITH SPONTANEOUS RUPTURE OF TYMPANIC MEMBRANE, RECURRENCE NOT SPECIFIED: ICD-10-CM

## 2024-12-02 DIAGNOSIS — H72.92 TYMPANIC MEMBRANE PERFORATION, LEFT: Primary | ICD-10-CM

## 2024-12-02 DIAGNOSIS — H60.92 OTITIS EXTERNA OF LEFT EAR, UNSPECIFIED CHRONICITY, UNSPECIFIED TYPE: ICD-10-CM

## 2024-12-02 PROCEDURE — 4010F ACE/ARB THERAPY RXD/TAKEN: CPT | Performed by: GENERAL PRACTICE

## 2024-12-02 PROCEDURE — 3060F POS MICROALBUMINURIA REV: CPT | Performed by: GENERAL PRACTICE

## 2024-12-02 PROCEDURE — 87205 SMEAR GRAM STAIN: CPT

## 2024-12-02 PROCEDURE — 1036F TOBACCO NON-USER: CPT | Performed by: GENERAL PRACTICE

## 2024-12-02 PROCEDURE — 87075 CULTR BACTERIA EXCEPT BLOOD: CPT

## 2024-12-02 PROCEDURE — 3048F LDL-C <100 MG/DL: CPT | Performed by: GENERAL PRACTICE

## 2024-12-02 PROCEDURE — 99204 OFFICE O/P NEW MOD 45 MIN: CPT | Performed by: GENERAL PRACTICE

## 2024-12-02 PROCEDURE — 3044F HG A1C LEVEL LT 7.0%: CPT | Performed by: GENERAL PRACTICE

## 2024-12-02 PROCEDURE — 87077 CULTURE AEROBIC IDENTIFY: CPT

## 2024-12-02 PROCEDURE — 1159F MED LIST DOCD IN RCRD: CPT | Performed by: GENERAL PRACTICE

## 2024-12-02 PROCEDURE — 87070 CULTURE OTHR SPECIMN AEROBIC: CPT

## 2024-12-02 RX ORDER — FAMOTIDINE 20 MG/1
20 TABLET, FILM COATED ORAL NIGHTLY
Qty: 30 TABLET | Refills: 3 | Status: SHIPPED | OUTPATIENT
Start: 2024-12-02 | End: 2025-12-02

## 2024-12-02 RX ORDER — CLOTRIMAZOLE 1 G/ML
SOLUTION TOPICAL 2 TIMES DAILY
Qty: 7 ML | Refills: 1 | Status: SHIPPED | OUTPATIENT
Start: 2024-12-02 | End: 2024-12-09

## 2024-12-02 NOTE — PROGRESS NOTES
Otolaryngology - Head and Neck Surgery Outpatient New Patient Visit Note        Assessment/Plan:   Problem List Items Addressed This Visit             ICD-10-CM       Pulmonary and Pneumonias    Chronic cough R05.3     Other Visit Diagnoses         Codes    Tympanic membrane perforation, left    -  Primary H72.92    Acute suppurative otitis media of left ear with spontaneous rupture of tympanic membrane, recurrence not specified     H66.012    Otitis externa of left ear, unspecified chronicity, unspecified type     H60.92    Relevant Medications    clotrimazole (Lotrimin) 1 % external solution    Other Relevant Orders    Tissue/Wound Culture/Smear    Laryngopharyngeal reflux (LPR)     K21.9    Relevant Medications    famotidine (Pepcid) 20 mg tablet            Left otitis media/externa, currently on augmentin/ofloxin.  Culture taken.  Black debris from ear recently, suspicious for fungal OE.  Will add clotrimazole gtt.        Chronic cough without response to controls for rhinitis. Will trial H2 blocker until follow up.      Follow up:  -plan for follow up in clinic as needed and in 2-4 weeks    All of the above findings, impressions, treatment planning and follow up plans were discussed with the patient who indicated understanding.  the patient was instructed to contact or return to clinic sooner if symptoms/signs persist or worsen despite the above management.      Triston Fournier MD  Otolaryngology - Head and Neck Surgery            History Of Present Illness  Lauren Rivers is a 69 y.o. female presenting for left ear fullness, otalgia, otorrhea.     Reports recent treatments for otitis with levaquin and cortisporin and more recently augmentin/ofloxin drops.  Multiple ear irrigations as well.    Noted black debris from left EAC yesterday.      No significant prior history of recurrent otitis  No inciting illness/trauma    No significant ongoing sinusitis.                  Past Medical History  She has a past medical  history of Achilles tendinitis, right leg (11/18/2019), Atherosclerotic heart disease of native coronary artery without angina pectoris (03/31/2022), Dorsalgia, unspecified (07/30/2021), Encounter for general adult medical examination without abnormal findings (12/14/2020), Essential (primary) hypertension (12/12/2022), Metabolic syndrome (10/11/2022), Other chest pain (09/15/2020), Other malaise (09/11/2019), Other specified disorders of synovium and tendon, other site (01/10/2020), Pain in right ankle and joints of right foot (03/05/2020), Pain in right knee, Pain in unspecified ankle and joints of unspecified foot (11/14/2019), Personal history of diseases of the skin and subcutaneous tissue (12/10/2019), Personal history of other diseases of the musculoskeletal system and connective tissue (12/18/2017), Personal history of other diseases of the nervous system and sense organs (01/31/2021), Personal history of other diseases of the nervous system and sense organs (10/12/2015), Personal history of other diseases of urinary system (04/30/2021), Personal history of other endocrine, nutritional and metabolic disease (12/15/2017), Personal history of other infectious and parasitic diseases (04/16/2014), Personal history of other mental and behavioral disorders (03/08/2021), Personal history of other specified conditions (04/26/2016), Personal history of other specified conditions (05/25/2022), Personal history of other specified conditions (07/29/2021), Personal history of other specified conditions (05/05/2020), Personal history of other specified conditions (04/29/2016), Rash and other nonspecific skin eruption (12/24/2020), and Unspecified inflammation of eyelid (01/26/2021).    Surgical History  She has a past surgical history that includes Coronary artery bypass graft (04/26/2016) and Wrist fracture surgery (Left).     Social History  She reports that she has never smoked. She has never used smokeless tobacco.  She reports that she does not drink alcohol and does not use drugs.    Family History  Family History   Problem Relation Name Age of Onset    Diabetes Mother      Thyroid disease Mother      Diabetes Father      Kidney disease Father      Hypertension Father      Glaucoma Father      Breast cancer Maternal Grandmother  65        Allergies  Oxycodone and Tramadol    Review of Systems  ROS: Pertinent positives as noted in HPI.    - CONSTITUTIONAL: Does not report weight loss, fever or chills.    - HEENT:   Ear: Does not report tinnitus, vertigo,    ,  ,    Nose: Does not report  ,  , epistaxis, decreased smell  Throat: Does not report pain, dysphagia, odynophagia  Larynx: Does not report hoarseness,  difficulty breathing, pain with speaking (odynophonia)  Neck: Does not report new masses, pain, swelling  Face: Does not report sinus pain, pressure, swelling, numbness, weakness     - RESPIRATORY: Does not report SOB or cough.    - CV: Does not report palpitations or chest pain.     - GI: Does not report abdominal pain, nausea, vomiting or diarrhea.    - : Does not report dysuria or urinary frequency.    - MSK: Does not report myalgia or joint pain.    - SKIN: Does not report rash or pruritus.    - NEUROLOGICAL: Does not report headache or syncope.    - PSYCHIATRIC: Does not report recent changes in mood. Does not report anxiety or depression.         Physical Exam:     GENERAL:   Alert & Oriented to person, place and time; Normal affect and appearance. Well developed and well nourished. Conversant & cooperative with examination.     HEAD:   Normocephalic, atraumatic. No sinus tenderness to palpation. Normal parotid bilaterally. Normal facial strength.     NEUROLOGIC:   Cranial nerves II-XII grossly intact, gait WNL. Normal mood and affect.    EYES:   Extraocular movements intact. Pupils equal, round, reactive to light and accommodation. No nystagmus, no ptosis. no scleral injection.    EAR:   Normal auricle. No  discomfort or TTP with manipulation.   Handheld otoscopic exam showed normal external auditory canal on the right.  Left EAC with pale mucoid wet debris.  Culture taken.  Debrided with suciton.    Right tympanic membrane clear and mobile without evidence of perforation, retraction or middle ear effusion.   Left tympanic membrane thickened/hyperemic with small pinhole perforation post/inferior.  Apparent pale effusion.      NOSE:   No external deformity. No external nasal lesions, lacerations, or scars. Nasal tip symmetrical with normal nasal valves.   Nasal cavity with essentially midline septum, normal mucosa and turbinates. No lesions, masses, purulence or polyps.     OC/OP:   Mucous membranes moist, no masses, lesions or exudates.   Normal tongue, floor of mouth, teeth, gums, lips. Normal posterior pharyngeal wall.    Normal tonsils without erythema, exudate or obvious calculi     NECK:   No neck masses or thyroid enlargement. Trachea midline. No tenderness to palpation    LYMPHATIC:   No cervical lymphadenopathy.     RESPIRATORY:   Symmetric chest elevation & no retractions. No significant hoarseness. No increased work of breathing.    CV:   No clubbing or cyanosis. No obvious edema    Skin:   No facial rashes, vesicles or lesions.     Extremities:   No gross abnormalities      Clinic Procedure    Binocular microscopy exam  Indication: tympanic membrane(s) could not be visualized adequately with handheld otoscopy.   Location:  bilateral ears  Visualization Instrument: A microscope was used to visualize through a speculum placed in the ear canal(s) to visualize the ear canal, tympanic membranes and to assist in assessment and removal of debris.  Findings:  see physical exam documentation  Patient Status: The patient tolerated the procedure well.   Complications: There were no complications.     Information review:  External sources (notes, imaging, lab results) listed below personally reviewed to aid in medical  decision making process.  -[facility] note 11/29/24  -[facility] note 10/15/24  -[facility] note 9/19/24

## 2024-12-03 ENCOUNTER — HOSPITAL ENCOUNTER (OUTPATIENT)
Dept: RADIOLOGY | Facility: CLINIC | Age: 69
Discharge: HOME | End: 2024-12-03
Payer: MEDICARE

## 2024-12-03 DIAGNOSIS — N28.1 COMPLEX RENAL CYST: ICD-10-CM

## 2024-12-03 PROCEDURE — 76770 US EXAM ABDO BACK WALL COMP: CPT

## 2024-12-03 PROCEDURE — 76770 US EXAM ABDO BACK WALL COMP: CPT | Performed by: RADIOLOGY

## 2024-12-04 LAB
BACTERIA SPEC CULT: ABNORMAL
GRAM STN SPEC: ABNORMAL
GRAM STN SPEC: ABNORMAL

## 2024-12-17 ENCOUNTER — APPOINTMENT (OUTPATIENT)
Dept: UROLOGY | Facility: CLINIC | Age: 69
End: 2024-12-17
Payer: MEDICARE

## 2024-12-17 DIAGNOSIS — N28.1 COMPLEX RENAL CYST: Primary | ICD-10-CM

## 2024-12-17 PROCEDURE — 99443 PR PHYS/QHP TELEPHONE EVALUATION 21-30 MIN: CPT | Performed by: UROLOGY

## 2024-12-17 PROCEDURE — 4010F ACE/ARB THERAPY RXD/TAKEN: CPT | Performed by: UROLOGY

## 2024-12-17 PROCEDURE — 3048F LDL-C <100 MG/DL: CPT | Performed by: UROLOGY

## 2024-12-17 PROCEDURE — 3060F POS MICROALBUMINURIA REV: CPT | Performed by: UROLOGY

## 2024-12-17 PROCEDURE — 3044F HG A1C LEVEL LT 7.0%: CPT | Performed by: UROLOGY

## 2024-12-17 PROCEDURE — 1123F ACP DISCUSS/DSCN MKR DOCD: CPT | Performed by: UROLOGY

## 2024-12-17 NOTE — PROGRESS NOTES
Subjective     This visit was completed via telephone. All issues as below were discussed and addressed but no physical exam was performed. Patient verbally consented to visit.      Lauren Rivers is a 69 y.o. female with history of Bosniak 2F complex renal cyst who presents today for follow up to review renal US. Patient is doing well. Denies any recent gross hematuria, fevers, chills, urinary retention, intractable flank or abdominal pain, nausea or vomiting.        Past Medical History:   Diagnosis Date    Achilles tendinitis, right leg 11/18/2019    Achilles bursitis of right lower extremity    Atherosclerotic heart disease of native coronary artery without angina pectoris 03/31/2022    CAD (coronary artery disease)    Dorsalgia, unspecified 07/30/2021    Right-sided back pain    Encounter for general adult medical examination without abnormal findings 12/14/2020    Medicare welcome exam    Essential (primary) hypertension 12/12/2022    Benign essential hypertension    Metabolic syndrome 10/11/2022    Metabolic syndrome    Other chest pain 09/15/2020    Pain, chest wall    Other malaise 09/11/2019    Debility    Other specified disorders of synovium and tendon, other site 01/10/2020    Achilles tendinosis of right lower extremity    Pain in right ankle and joints of right foot 03/05/2020    Right ankle pain    Pain in right knee     Right knee pain, unspecified chronicity    Pain in unspecified ankle and joints of unspecified foot 11/14/2019    Ankle joint pain    Personal history of diseases of the skin and subcutaneous tissue 12/10/2019    History of seborrheic dermatitis    Personal history of other diseases of the musculoskeletal system and connective tissue 12/18/2017    History of lateral epicondylitis of left elbow    Personal history of other diseases of the nervous system and sense organs 01/31/2021    History of sleep apnea    Personal history of other diseases of the nervous system and sense organs  10/12/2015    History of sleep apnea    Personal history of other diseases of urinary system 04/30/2021    History of stress incontinence    Personal history of other endocrine, nutritional and metabolic disease 12/15/2017    History of obesity    Personal history of other infectious and parasitic diseases 04/16/2014    History of onychomycosis    Personal history of other mental and behavioral disorders 03/08/2021    History of anxiety    Personal history of other specified conditions 04/26/2016    History of balance disorder    Personal history of other specified conditions 05/25/2022    History of abdominal pain    Personal history of other specified conditions 07/29/2021    History of flank pain    Personal history of other specified conditions 05/05/2020    History of hoarseness    Personal history of other specified conditions 04/29/2016    History of balance disorder    Rash and other nonspecific skin eruption 12/24/2020    Rash    Unspecified inflammation of eyelid 01/26/2021    Dermatitis of eyelid     Past Surgical History:   Procedure Laterality Date    CORONARY ARTERY BYPASS GRAFT  04/26/2016    CABG    WRIST FRACTURE SURGERY Left      Family History   Problem Relation Name Age of Onset    Diabetes Mother      Thyroid disease Mother      Diabetes Father      Kidney disease Father      Hypertension Father      Glaucoma Father      Breast cancer Maternal Grandmother  65     Current Outpatient Medications   Medication Sig Dispense Refill    amLODIPine (Norvasc) 10 mg tablet Take 1 tablet (10 mg) by mouth once daily.      aspirin 81 mg capsule Take 81 mg by mouth once daily.      atenolol (Tenormin) 100 mg tablet atenolol 100 mg tablet      atorvastatin (Lipitor) 40 mg tablet atorvastatin 40 mg tablet      CALCIUM ACETATE ORAL Take 500 mg by mouth twice a day.      cetirizine (ZyrTEC) 10 mg tablet Take by mouth.      coenzyme Q-10 100 mg capsule Take 1 capsule (100 mg) by mouth once daily.       ERGOCALCIFEROL, VITAMIN D2, IM Take by mouth.      famotidine (Pepcid) 20 mg tablet Take 1 tablet (20 mg) by mouth once daily at bedtime. 30 tablet 3    fenofibrate (Tricor) 145 mg tablet fenofibrate nanocrystallized 145 mg tablet      hydroCHLOROthiazide (HYDRODiuril) 25 mg tablet hydrochlorothiazide 25 mg tablet      ketoconazole (NIZOral) 2 % shampoo Wash affected areas of scalp 2-3 times weekly as directed 120 mL 11    losartan (Cozaar) 100 mg tablet Take by mouth.      meloxicam (Mobic) 15 mg tablet Take 1 tablet (15 mg) by mouth once daily. 30 tablet 11    meloxicam (Mobic) 15 mg tablet Take 1 tablet (15 mg) by mouth once daily. 60 tablet 0    metFORMIN (Glucophage) 500 mg tablet Take 1 tablet (500 mg) by mouth once daily. 90 tablet 3    multivit-min-ferrous sulfate (One Daily Multi-Vit w-Mineral) 4.5 mg iron tablet Take by mouth.      omega-3s-dha-epa-fish oil-D3 560 mg-1,680 mg -1,000 unit/5mL liquid Take by mouth.       No current facility-administered medications for this visit.     Allergies   Allergen Reactions    Oxycodone Other    Tramadol Other     Social History     Socioeconomic History    Marital status:      Spouse name: Not on file    Number of children: Not on file    Years of education: Not on file    Highest education level: Not on file   Occupational History    Not on file   Tobacco Use    Smoking status: Never    Smokeless tobacco: Never   Substance and Sexual Activity    Alcohol use: Never    Drug use: Never    Sexual activity: Not on file   Other Topics Concern    Not on file   Social History Narrative    Not on file     Social Drivers of Health     Financial Resource Strain: Not on file   Food Insecurity: Not on file   Transportation Needs: Not on file   Physical Activity: Not on file   Stress: Not on file   Social Connections: Not on file   Intimate Partner Violence: Not on file   Housing Stability: Not on file       Review of Systems  Pertinent items are noted in  HPI.    Objective     Lab Review  Lab Results   Component Value Date    WBC 6.1 04/23/2024    RBC 5.05 04/23/2024    HGB 14.1 04/23/2024    HCT 44.2 04/23/2024     04/23/2024      Lab Results   Component Value Date    BUN 21 04/23/2024    CREATININE 0.95 04/23/2024          Assessment/Plan   There are no diagnoses linked to this encounter.  Complex Renal Cyst    I reviewed renal US from 12/3/2024 which shows stable septated cyst right kidney maximal diameter 2.9 cm.    I discussed the findings with the patient.     We will follow up  virtually in 1 year with renal US.     All questions were answered to the patient's satisfaction. Patient agrees with the plan and wishes to proceed. Follow-up will be scheduled appropriately.     I spent 25 minutes of dedicated E&M time, including preparation and review of records, notes, and data, time spent with patient/family, and documentation.     Scribed for Dr. Pimentel by Olimpia Up. I , Dr Pimentel, have personally reviewed and agreed with the information entered by the Virtual Scribe.

## 2024-12-18 ENCOUNTER — APPOINTMENT (OUTPATIENT)
Dept: OTOLARYNGOLOGY | Facility: CLINIC | Age: 69
End: 2024-12-18
Payer: MEDICARE

## 2024-12-18 VITALS — BODY MASS INDEX: 42.91 KG/M2 | WEIGHT: 250 LBS

## 2024-12-18 DIAGNOSIS — Z86.69 HISTORY OF PERFORATION OF TYMPANIC MEMBRANE: Primary | ICD-10-CM

## 2024-12-18 DIAGNOSIS — J31.0 CHRONIC RHINITIS: ICD-10-CM

## 2024-12-18 DIAGNOSIS — H66.92 LEFT OTITIS MEDIA, UNSPECIFIED OTITIS MEDIA TYPE: ICD-10-CM

## 2024-12-18 PROCEDURE — 92504 EAR MICROSCOPY EXAMINATION: CPT | Performed by: GENERAL PRACTICE

## 2024-12-18 PROCEDURE — 1159F MED LIST DOCD IN RCRD: CPT | Performed by: GENERAL PRACTICE

## 2024-12-18 PROCEDURE — 1036F TOBACCO NON-USER: CPT | Performed by: GENERAL PRACTICE

## 2024-12-18 PROCEDURE — 3048F LDL-C <100 MG/DL: CPT | Performed by: GENERAL PRACTICE

## 2024-12-18 PROCEDURE — 3060F POS MICROALBUMINURIA REV: CPT | Performed by: GENERAL PRACTICE

## 2024-12-18 PROCEDURE — 3044F HG A1C LEVEL LT 7.0%: CPT | Performed by: GENERAL PRACTICE

## 2024-12-18 PROCEDURE — 99214 OFFICE O/P EST MOD 30 MIN: CPT | Performed by: GENERAL PRACTICE

## 2024-12-18 PROCEDURE — 4010F ACE/ARB THERAPY RXD/TAKEN: CPT | Performed by: GENERAL PRACTICE

## 2024-12-18 PROCEDURE — 1123F ACP DISCUSS/DSCN MKR DOCD: CPT | Performed by: GENERAL PRACTICE

## 2024-12-18 RX ORDER — METHYLPREDNISOLONE 4 MG/1
TABLET ORAL
Qty: 21 TABLET | Refills: 0 | Status: SHIPPED | OUTPATIENT
Start: 2024-12-18

## 2024-12-18 RX ORDER — GUAIFENESIN, PSEUDOEPHEDRINE HYDROCHLORIDE 600; 60 MG/1; MG/1
1 TABLET, EXTENDED RELEASE ORAL EVERY 12 HOURS
Qty: 60 TABLET | Refills: 2 | Status: SHIPPED | OUTPATIENT
Start: 2024-12-18 | End: 2025-12-18

## 2024-12-18 RX ORDER — AMOXICILLIN 875 MG/1
875 TABLET, FILM COATED ORAL 2 TIMES DAILY
Qty: 14 TABLET | Refills: 0 | Status: SHIPPED | OUTPATIENT
Start: 2024-12-18 | End: 2024-12-25

## 2024-12-18 NOTE — PROGRESS NOTES
Otolaryngology - Head and Neck Surgery Outpatient New Patient Visit Note        Assessment/Plan:   Problem List Items Addressed This Visit    None  Visit Diagnoses         Codes    History of perforation of tympanic membrane    -  Primary Z86.69    Left otitis media, unspecified otitis media type     H66.92    Chronic rhinitis     J31.0              69yoF with recent left OE and TM perforation, treated with clotrimazole for concerns for fungal component.  Culture confirmed candida.    TM perforation closed, but middle ear effusion with some associated erythema and pale fluid in middle ear.  Will treat with amoxicillin, mucinexD, medrol and rhinitis medications to aid in resolution.    Audiogram to aid in eval.      Follow up:  -plan for follow up in clinic as needed, after completion of ordered studies, and in 1-2 months    All of the above findings, impressions, treatment planning and follow up plans were discussed with the patient who indicated understanding.  the patient was instructed to contact or return to clinic sooner if symptoms/signs persist or worsen despite the above management.      Triston Fournier MD  Otolaryngology - Head and Neck Surgery            History Of Present Illness  Lauren Rivers is a 69 y.o. female presenting for follow up of treatment for fungal OE on the left with associated TM perforation and evidence of otitis media.      Reports no ongoing otalgia, otorrhea, but ongoing L ear fullness.   Some sinus congestion but no sinus pain or purulent rhinorrhea/PND.    Recall    Lauren Rivers is a 69 y.o. female presenting for left ear fullness, otalgia, otorrhea.      Reports recent treatments for otitis with levaquin and cortisporin and more recently augmentin/ofloxin drops.  Multiple ear irrigations as well.     Noted black debris from left EAC yesterday.       No significant prior history of recurrent otitis  No inciting illness/trauma     No significant ongoing sinusitis.            Past  Medical History  She has a past medical history of Achilles tendinitis, right leg (11/18/2019), Atherosclerotic heart disease of native coronary artery without angina pectoris (03/31/2022), Dorsalgia, unspecified (07/30/2021), Encounter for general adult medical examination without abnormal findings (12/14/2020), Essential (primary) hypertension (12/12/2022), Metabolic syndrome (10/11/2022), Other chest pain (09/15/2020), Other malaise (09/11/2019), Other specified disorders of synovium and tendon, other site (01/10/2020), Pain in right ankle and joints of right foot (03/05/2020), Pain in right knee, Pain in unspecified ankle and joints of unspecified foot (11/14/2019), Personal history of diseases of the skin and subcutaneous tissue (12/10/2019), Personal history of other diseases of the musculoskeletal system and connective tissue (12/18/2017), Personal history of other diseases of the nervous system and sense organs (01/31/2021), Personal history of other diseases of the nervous system and sense organs (10/12/2015), Personal history of other diseases of urinary system (04/30/2021), Personal history of other endocrine, nutritional and metabolic disease (12/15/2017), Personal history of other infectious and parasitic diseases (04/16/2014), Personal history of other mental and behavioral disorders (03/08/2021), Personal history of other specified conditions (04/26/2016), Personal history of other specified conditions (05/25/2022), Personal history of other specified conditions (07/29/2021), Personal history of other specified conditions (05/05/2020), Personal history of other specified conditions (04/29/2016), Rash and other nonspecific skin eruption (12/24/2020), and Unspecified inflammation of eyelid (01/26/2021).    Surgical History  She has a past surgical history that includes Coronary artery bypass graft (04/26/2016) and Wrist fracture surgery (Left).     Social History  She reports that she has never smoked.  She has never used smokeless tobacco. She reports that she does not drink alcohol and does not use drugs.    Family History  Family History   Problem Relation Name Age of Onset    Diabetes Mother      Thyroid disease Mother      Diabetes Father      Kidney disease Father      Hypertension Father      Glaucoma Father      Breast cancer Maternal Grandmother  65        Allergies  Oxycodone and Tramadol    Review of Systems  ROS: Pertinent positives as noted in HPI.    - CONSTITUTIONAL: Does not report weight loss, fever or chills.    - HEENT:   Ear: Does not report  , vertigo,    , otalgia, otorrhea  Nose: Does not report  , rhinorrhea, epistaxis, decreased smell  Throat: Does not report pain, dysphagia, odynophagia  Larynx: Does not report hoarseness,  difficulty breathing, pain with speaking (odynophonia)  Neck: Does not report new masses, pain, swelling  Face: Does not report sinus pain, pressure, swelling, numbness, weakness     - RESPIRATORY: Does not report SOB or cough.    - CV: Does not report palpitations or chest pain.     - GI: Does not report abdominal pain, nausea, vomiting or diarrhea.    - : Does not report dysuria or urinary frequency.    - MSK: Does not report myalgia or joint pain.    - SKIN: Does not report rash or pruritus.    - NEUROLOGICAL: Does not report headache or syncope.    - PSYCHIATRIC: Does not report recent changes in mood. Does not report anxiety or depression.         Physical Exam:     GENERAL:   Alert & Oriented to person, place and time; Normal affect and appearance. Well developed and well nourished. Conversant & cooperative with examination.     HEAD:   Normocephalic, atraumatic. No sinus tenderness to palpation. Normal parotid bilaterally. Normal facial strength.     NEUROLOGIC:   Cranial nerves II-XII grossly intact, gait WNL. Normal mood and affect.    EYES:   Extraocular movements intact. Pupils equal, round, reactive to light and accommodation. No nystagmus, no ptosis. no  scleral injection.    EAR:   Normal auricle. No discomfort or TTP with manipulation.   Handheld otoscopic exam showed normal external auditory canals bilaterally. No purulence or EAC inflammation. Minimal cerumen.   Right tympanic membrane clear and mobile without evidence of perforation, retraction or middle ear effusion.   Left tympanic membrane thickened, erythematous with apparent partial pale effusion, without perforation.      NOSE:   No external deformity. No external nasal lesions, lacerations, or scars. Nasal tip symmetrical with normal nasal valves.   Nasal cavity with essentially midline septum, edematous mucosa and turbinates. No lesions, masses, purulence or polyps.     OC/OP:   Mucous membranes moist, no masses, lesions or exudates.   Normal tongue, floor of mouth, teeth, gums, lips. Normal posterior pharyngeal wall.    Normal tonsils without erythema, exudate or obvious calculi     NECK:   No neck masses or thyroid enlargement. Trachea midline. No tenderness to palpation    LYMPHATIC:   No cervical lymphadenopathy.     RESPIRATORY:   Symmetric chest elevation & no retractions. No significant hoarseness. No increased work of breathing.    CV:   No clubbing or cyanosis. No obvious edema    Skin:   No facial rashes, vesicles or lesions.     Extremities:   No gross abnormalities      Clinic Procedure    Binocular microscopy exam  Indication: tympanic membrane(s) could not be visualized adequately with handheld otoscopy.   Location:  bilateral ears  Visualization Instrument: A microscope was used to visualize through a speculum placed in the ear canal(s) to visualize the ear canal, tympanic membranes and to assist in assessment and removal of debris.  Findings:  see physical exam documentation  Patient Status: The patient tolerated the procedure well.   Complications: There were no complications.     Information review:  External sources (notes, imaging, lab results) listed below personally reviewed to aid  in medical decision making process.  -  -  -

## 2024-12-24 ENCOUNTER — APPOINTMENT (OUTPATIENT)
Dept: UROLOGY | Facility: CLINIC | Age: 69
End: 2024-12-24
Payer: MEDICARE

## 2025-01-07 ENCOUNTER — OFFICE VISIT (OUTPATIENT)
Dept: SLEEP MEDICINE | Facility: CLINIC | Age: 70
End: 2025-01-07
Payer: MEDICARE

## 2025-01-07 VITALS
SYSTOLIC BLOOD PRESSURE: 142 MMHG | RESPIRATION RATE: 16 BRPM | OXYGEN SATURATION: 98 % | WEIGHT: 250 LBS | BODY MASS INDEX: 40.18 KG/M2 | DIASTOLIC BLOOD PRESSURE: 82 MMHG | HEART RATE: 58 BPM | TEMPERATURE: 95.9 F | HEIGHT: 66 IN

## 2025-01-07 DIAGNOSIS — G47.33 OSA ON CPAP: ICD-10-CM

## 2025-01-07 DIAGNOSIS — I10 BENIGN ESSENTIAL HYPERTENSION: ICD-10-CM

## 2025-01-07 DIAGNOSIS — E66.01 MORBID OBESITY (MULTI): ICD-10-CM

## 2025-01-07 PROCEDURE — 99214 OFFICE O/P EST MOD 30 MIN: CPT | Performed by: INTERNAL MEDICINE

## 2025-01-07 PROCEDURE — 1160F RVW MEDS BY RX/DR IN RCRD: CPT | Performed by: INTERNAL MEDICINE

## 2025-01-07 PROCEDURE — 1159F MED LIST DOCD IN RCRD: CPT | Performed by: INTERNAL MEDICINE

## 2025-01-07 PROCEDURE — 4010F ACE/ARB THERAPY RXD/TAKEN: CPT | Performed by: INTERNAL MEDICINE

## 2025-01-07 PROCEDURE — 3077F SYST BP >= 140 MM HG: CPT | Performed by: INTERNAL MEDICINE

## 2025-01-07 PROCEDURE — 3008F BODY MASS INDEX DOCD: CPT | Performed by: INTERNAL MEDICINE

## 2025-01-07 PROCEDURE — 1123F ACP DISCUSS/DSCN MKR DOCD: CPT | Performed by: INTERNAL MEDICINE

## 2025-01-07 PROCEDURE — G2211 COMPLEX E/M VISIT ADD ON: HCPCS | Performed by: INTERNAL MEDICINE

## 2025-01-07 PROCEDURE — 3079F DIAST BP 80-89 MM HG: CPT | Performed by: INTERNAL MEDICINE

## 2025-01-07 ASSESSMENT — SLEEP AND FATIGUE QUESTIONNAIRES
HOW LIKELY ARE YOU TO NOD OFF OR FALL ASLEEP WHILE LYING DOWN TO REST IN THE AFTERNOON WHEN CIRCUMSTANCES PERMIT: HIGH CHANCE OF DOZING
HOW LIKELY ARE YOU TO NOD OFF OR FALL ASLEEP IN A CAR, WHILE STOPPED FOR A FEW MINUTES IN TRAFFIC: WOULD NEVER DOZE
ESS-CHAD TOTAL SCORE: 7
SITING INACTIVE IN A PUBLIC PLACE LIKE A CLASS ROOM OR A MOVIE THEATER: WOULD NEVER DOZE
HOW LIKELY ARE YOU TO NOD OFF OR FALL ASLEEP WHILE WATCHING TV: SLIGHT CHANCE OF DOZING
HOW LIKELY ARE YOU TO NOD OFF OR FALL ASLEEP WHEN YOU ARE A PASSENGER IN A CAR FOR AN HOUR WITHOUT A BREAK: SLIGHT CHANCE OF DOZING
HOW LIKELY ARE YOU TO NOD OFF OR FALL ASLEEP WHILE SITTING AND READING: SLIGHT CHANCE OF DOZING
HOW LIKELY ARE YOU TO NOD OFF OR FALL ASLEEP WHILE SITTING AND TALKING TO SOMEONE: WOULD NEVER DOZE
HOW LIKELY ARE YOU TO NOD OFF OR FALL ASLEEP WHILE SITTING QUIETLY AFTER LUNCH WITHOUT ALCOHOL: SLIGHT CHANCE OF DOZING

## 2025-01-07 NOTE — PROGRESS NOTES
[facility] SLEEP [facility]IC[facility]   FOLLOW-UP VISIT NOTE      PCP: Wendy Bright MD    HISTORY OF PRESENT ILLNESS     Patient ID: Lauren Rivers is a 69 y.o. female who presents for follow-up of CATHY on PAP after pressure change.     Patient is here alone today.  To review, patient's medical history is notable for morbid obesity (BMI 40), HTN, CAD, and CATHY on CPAP      Interval History  Patient was last seen in 10/1/2024. Plan was to change machine settings from fixed CPAP 9 cm H2O to auto-CPAP 9-15 cm H2O .  Since last visit, patient has been using machine every night. Current mask interface being used is Resmed Airfit N20 nasal mask. Per records, patient is getting supplies thru [employer]  Patient denies machine problems, perceived mask leak, air hunger, skin irritation, and nasal congestion.   Complains of dry mouth and occasional aerophagia  The following are patient's perceived benefits of PAP: decreased snoring / choking / gasping, refreshing sleep, and decreased daytime sleepiness and/or fatigue    RLS Follow-up:   Denies    SLEEP STUDY HISTORY (personally reviewed raw data such as interpretation report, data sheet, hypnogram, and titration table if available and applicable)  PSG 6/6/2008: AHI 31.8, supine AHI 35.5, REM MAITE 68.6 (no available records except documentation on clinic note from [facility])   Split night PSG 2/8/2023: Pre-PAP RDI3% 32.4 (Supine RDI3% 92.1, non-supine RDI3% 19.2), RDI4% 20.8 (supine RDI 69.8, non-supine RDI 9.9), SpO2 arcelia 87%. CPAP was started at 8-10 cm H2O. At CPAP 9 cm H2O with REM supine sleep, RDI 0.9, REM RDI 0, supine RDI 0.9, SpO2 arcelia 90%    SLEEP-WAKE SCHEDULE  Bedtime:  1-2 AM daily  Subjective sleep latency: less than 10 minutes  Difficulty falling asleep: No  Number of awakenings: once per night to go to bathroom  Nocturia: No  Falls back asleep right away  Final wake time:  8:30 AM to 9:30 AM daily  Out of bed time:  8:30 AM to 9:30 AM daily  Shift work: No    Naps: 1-2 times per week for 1.5 to 2 hours  Feels rested after a nap: Yes (using CPAP during naps)  Average sleep duration (excluding naps): 6 hours    SLEEP ENVIRONMENT  Sleep location: bed  Sleep status: sleeps with  and pet cat  Preferred sleep position: back and left side    SOCIAL HISTORY  Smoking: no  ETOH: no  Marijuana: no  Caffeine: 1-2 cups tea every other day  Sleep aids: no    WEIGHT: stable    Claustrophobia: No     REVIEW OF SYSTEMS     All other systems have been reviewed and are negative.    ALLERGIES     Allergies   Allergen Reactions    Oxycodone Other    Tramadol Other       MEDICATIONS     Current Outpatient Medications   Medication Sig Dispense Refill    amLODIPine (Norvasc) 10 mg tablet Take 1 tablet (10 mg) by mouth once daily.      aspirin 81 mg capsule Take 81 mg by mouth once daily.      atenolol (Tenormin) 100 mg tablet atenolol 100 mg tablet      atorvastatin (Lipitor) 40 mg tablet atorvastatin 40 mg tablet      CALCIUM ACETATE ORAL Take 500 mg by mouth twice a day.      cetirizine (ZyrTEC) 10 mg tablet Take by mouth.      coenzyme Q-10 100 mg capsule Take 1 capsule (100 mg) by mouth once daily.      ERGOCALCIFEROL, VITAMIN D2, IM Take by mouth.      famotidine (Pepcid) 20 mg tablet Take 1 tablet (20 mg) by mouth once daily at bedtime. 30 tablet 3    fenofibrate (Tricor) 145 mg tablet fenofibrate nanocrystallized 145 mg tablet      hydroCHLOROthiazide (HYDRODiuril) 25 mg tablet hydrochlorothiazide 25 mg tablet      losartan (Cozaar) 100 mg tablet Take by mouth.      metFORMIN (Glucophage) 500 mg tablet Take 1 tablet (500 mg) by mouth once daily. 90 tablet 3    multivit-min-ferrous sulfate (One Daily Multi-Vit w-Mineral) 4.5 mg iron tablet Take by mouth.      omega-3s-dha-epa-fish oil-D3 560 mg-1,680 mg -1,000 unit/5mL liquid Take by mouth.      pseudoephedrine-guaifenesin (Mucinex D)  mg 12 hr tablet Take 1 tablet by mouth every 12 hours. Do not crush, chew, or split. 60  "tablet 2    ketoconazole (NIZOral) 2 % shampoo Wash affected areas of scalp 2-3 times weekly as directed (Patient not taking: Reported on 1/7/2025) 120 mL 11    meloxicam (Mobic) 15 mg tablet Take 1 tablet (15 mg) by mouth once daily. (Patient not taking: Reported on 1/7/2025) 30 tablet 11    methylPREDNISolone (Medrol Dospak) 4 mg tablets Follow schedule on package instructions (Patient not taking: Reported on 1/7/2025) 21 tablet 0     No current facility-administered medications for this visit.       Active Problems, Allergy List, Medication List, and PMH/PSH/FH/Social Hx have been reviewed and reconciled in chart. No significant changes unless documented in the pertinent chart section. Updates made when necessary.       PHYSICAL EXAM     VITAL SIGNS: /82   Pulse 58   Temp 35.5 °C (95.9 °F)   Resp 16   Ht 1.676 m (5' 6\")   Wt 113 kg (250 lb)   SpO2 98%   BMI 40.35 kg/m²     NECK CIRCUMFERENCE: 17 inches    Today ESS: 7  Last visit ESS: 14  Last visit KUNAL: 10    Physical Exam  Constitutional: Awake, not in distress  Skin: Warm, no rash  Neuro: No tremors, moves all extremities  Psych: alert and oriented to time, place, and person    RESULTS/DATA     No results found for: \"IRON\", \"TRANSFERRIN\", \"IRONSAT\", \"TIBC\", \"FERRITIN\"    Bicarbonate   Date Value Ref Range Status   04/23/2024 31 21 - 32 mmol/L Final       PAP Adherence  A PAP adherence data was obtained and reviewed personally today in clinic. (see scanned document in EPIC)  Machine: Resmed Airsense 11 Autoset  Settings:  auto-CPAP 9-15 cm H2O and EPR 3  Date Range: 12/7/2024 to 1/5/2025  Days used: 30/30  >4 hrs usage: 100%  Average usage hours (days used): 7 hours 12 minutes  Pressure: Median 9.1, 95th percentile 10.3, Maximum 10.8  Leak: 95th percentile 24.1, maximum 35.2  AHI: 0.2 (RERA index 0, RUBY 0)    ASSESSMENT/PLAN     Lauren Rivers is a 69 y.o. female who returns in [facility] for the following " problems:    OBSTRUCTIVE SLEEP APNEA, MODERATE positional (pre-PAP RDI3% 32.4, RDI4% 19.2)  SLEEP-RELATED HYPOXEMIA  - Now on auto-CPAP 9-15 cm H2O and EPR 3  - PAP adherence data reviewed today. Usage days 30/30, days> 4 hours at 100%, residual AHI 0.2.   - Patient reports improvement of CATHY symptoms.   - Renew PAP supplies. Order placed and sent to [employer].   - Due to dry mouth, changed settings in clinic today to humidity level 6 from level 4. Advised patient to continue using machine every night all night and if napping more than 30 minutes.  - Continue supportive management as follows: Lose weight. Stay off your back when sleeping. Avoid smoking, alcohol, and sedating medications if applicable. Don't drive when sleepy.    MORBID OBESITY  - Recommend weight loss with diet and exercise.  - Weight loss can help in long term management of CATHY.  - Defer management to PCP.    HYPERTENSION  - BP slightly high today.   - Continue anti-hypertensive medications per PCP.  - Supportive management: low salt DASH diet (less than 2000 mg sodium intake daily), moderate intensity aerobic exercise at least 30 minutes 5 days per week, reduce stress, quit smoking, limit alcohol, lose weight, and monitor BP once daily  - PCP following. Defer management to PCP.      Follow-up in 1 year.    All of patient's questions were answered. She verbalizes understanding and agreement with my assessment and plan. Refer to after-visit-summary (AVS) for patient education and if applicable, for more details on sleep study preparation, troubleshooting issues with PAP usage, proper cleaning instructions of PAP supplies, and usual recommended replacement schedule for each of the PAP supplies.

## 2025-01-15 NOTE — PATIENT INSTRUCTIONS
"Thank you for coming to the [facility] Medicine Clinic today! Your sleep medicine provider today was: Heather Ceballos MD Below is a summary of your treatment plan, patient education, other important information, and our contact numbers.      TREATMENT PLAN     - Continue current machine settings. Continue using machine every night all night.   - Renew PAP supplies. Order placed and sent to [facility].  - Due to dry mouth, increase the humidity settings to level 6.  - Please read the \"Patient Education\" section below for more detailed information. Try implementing tips, reminders, strategies, and supportive management.   - If not yet done, please sign up for PAK to make a future schedule, send prescription requests, or send messages.    Follow-up Appointment:   Follow-up in 1 year.    PATIENT EDUCATION     OBSTRUCTIVE SLEEP APNEA (CATHY) is a sleep disorder where your upper airway muscles relax during sleep and the airway intermittently and repetitively narrows and collapses leading to partially blocked airway (hypopnea) or completely blocked airway (apnea) which, in turn, can disrupt breathing in sleep, lower oxygen levels while you sleep and cause night time wakings. Because both apnea and hypopnea may cause higher carbon dioxide or low oxygen levels, untreated CATHY can lead to heart arrhythmia, elevation of blood pressure, and make it harder for the body to consolidate memory and facilitate metabolism (leading to higher blood sugars at night). Frequent partial arousals occur during sleep resulting in sleep deprivation and daytime sleepiness. CATHY is associated with an increased risk of cardiovascular disease, stroke, hypertension, and insulin resistance. Moreover, untreated CATHY with excessive daytime sleepiness can increase the risk of motor vehicular accidents.    Below are conservative strategies for CATHY regardless of CATHY severity are:   Positional therapy - Avoid sleeping on your back.   Healthy diet and " regular exercise to optimize weight is highly encouraged.   Avoid alcohol late in the evening and sedative-hypnotics as these substances can make sleep apnea worse.   Improve breathing through the nose with intranasal steroid spray, saline rinse, or antihistamines    Safety: Avoid driving vehicle and operating heavy equipment while sleepy. Drowsy driving may lead to life-threatening motor vehicle accidents. A person driving while sleepy is 5 times more likely to have an accident. If you feel sleepy, pull over and take a short power nap (sleep for less than 30 minutes). Otherwise, ask somebody to drive you.    Treatment options for sleep apnea include weight management, positional therapy, Positive Airway Therapy (PAP) therapy, oral appliance therapy, hypoglossal nerve stimulator (Inspire) and select airway surgeries.    Annual Reminders About Your Sleep Apnea    Your sleep apnea is well controlled based on reviewing your PAP Data Report.     General Reminders:  Continue current machine settings. Continue using machine every night. Need at least 4 hours daily usage.   Remember to clean your mask, tubings, and water chamber regularly as instructed.   Know the replacement schedule of your supplies/ accessories and contact your DME (durable medical equipment) provider if you are due for them.   Avoid driving or operating heavy machinery when drowsy. A person driving while sleepy is 5 times more likely to have an accident. If you feel sleepy, pull over and take a short power nap (sleep for less than 30 minutes). Otherwise, ask somebody to drive you.    Follow-up sooner through [facility] or calling our office if you have any of the following symptoms:  Snoring or stopping breathing while using the machine  Recurrence of fatigue, sleepiness, insomnia, and other symptoms you had prior using machine  Persistent or worsening fatigue or sleepiness despite regular use of machine  Issues tolerating the machine like bloating  sensation, air hunger, too much hot air, too much pressure, taking off mask without recall in the middle of sleep, etc.     Other conservative measures to improve sleep apnea:  Losing weight can lead to some improvement of CATHY which means you will need lower pressures in machine to control your CATHY. In some patients, they don't need to use the machine after bariatric surgery. Hence, consider medical and/or surgical weight loss especially if your BMI is more than 35.  Avoiding alcohol, sedative-hypnotics, and sedating medications is imperative as these substances can worsen snoring and sleep apnea  If you have nasal congestion or seasonal allergies, improving your breathing through the nose is critical for treating sleep apnea, tolerating CPAP, and improving your sleep; hence, using intranasal steroid spray like Flonase might help. Make sure you know the proper way to use it.  Stay off your back when sleeping.    Common issues with PAP machine:  Mask gets dislodged when turning to the side: Consider getting a CPAP pillow or switching to a mask with hose on top.     Dry mouth:  Your machine has built-in humidifier that heats up the air to prevent dry mouth. It can be adjusted to your comfort. You can try that first and increase setting one level one night at a time to check which setting is comfortable and effective in lessening dry mouth. In some patients with heated hose, adjusting tube temperature to make air warmer can improve dry mouth. If dry mouth persists despite adjusting humidity or tube temperature setting, may apply OTC Biotene gel over the gums at bedtime.  If Biotene gel is not effective, consider trying XEROSTOM gel from [employer].  Also, eliminate or reduce dose of medications that can cause dry mouth if possible. Lastly, may try getting a separate room humidifier machine.    Airleaks: Please call DME as they may need to adjust your mask or refit you with a different kind or different size of mask. In  "addition, you can ask DME for tips on getting a good mask seal and mask fit.     Difficulty tolerating the mask: Contact your DME to try a different kind of mask and/or call office to get a referral to Sleep Psychologist for CPAP desensitization. CPAP desensitization technique is a set of strategies that helps patient cope with claustrophobia and anxiety related to wearing mask. Alternatively, we can do a daytime mini-sleep study called PAP-nap trial wherein you will try on different kinds of mask and the sleep technician will try different pressure settings on CPAP and BPAP machines to see which specific pressure is tolerable and comfortable for you.     Water droplets or moisture within the hose and/or mask: This is called rain-out and it is caused by condensation of too much heated humidity on the cooler walls of the hose. If you have rain-out, turn down humidity settings or get a heated hose. If you already have a heated hose, turn up the \"tube temperature\" of the heated hose. Alternatively, if you don't want to get a heated hose or warmer air, may wrap the CPAP hose with stockings to keep it somewhat warm. Also, you need to place the machine on the floor and lower the hose so that water won't travel upward towards your mask.     Maintaining your CPAP/BPAP device:    The humidification chamber (aka water tank or water chamber) needs to be filled with distilled water to prevent buildup of white deposits in the future. If you cannot find distilled water, you can use tap water but expect to have white deposits buildup seen after prolonged use with tap water. If you start seeing white deposits on the water chamber, you can clean it by filling it with equal parts of distilled white vinegar and water. Let the vinegar-water mixture sit for 2 hours, and then rinse it with running tap water. Clean with soap and water then let it dry.     You should try to keep your machine clean in order to work well. Here are some tips " to clean PAP supplies / accessories:    Clean the humidification chamber (aka water tank) as well as your mask and tubing at least once a week with soap and water.   Alternatively, you can fill a sink or basin with warm water and add a little mild detergent, like Ivory dish soap. Gently wipe your supplies with the soapy water to free all the oils and dirt that may have collected. Once that's done, rinse these items with clean water until the soap is gone and let them air dry. You can hang your tubing over the curtain kurt in your bathroom so that it dries.  The mask insert (part of the mask that has contact with your skin) needs to be cleaned with soap and water daily. Another option is to wipe them down with CPAP wipes or baby wipes.    You should replace your mask and tubing frequently in order to prevent bacteria buildup, machine damage, and mask seal issues. The older the mask and hose, the high likelihood that there is bacteria buildup in it especially if they are not cleaned regularly. Dirty filters damage machines because build-up of dust and contaminants can cause machine to over-heat, and in time, damage the motor of machine. Cushions lose their seal over time as most masks are made of plastic and silicone while headgear is made of neoprene. These materials will break down with age and frequent use. Here is the recommended replacement schedule for PAP supplies / accessories:    Twice a month- disposable filters and cushions for nasal mask or nasal pillows.  Once a month- cushion for full face mask  Every 3 months- mask with headgear and PAP tubing (standard or heated hose)  Every 6 months- reusable filter, water chamber, and chin strap     Other useful information:    Some people do not put water in the tank while other people prefers to put water in the tank to prevent mouth dryness. Try to experiment to determine which is more comfortable for you.   In general, new machines have 2 years warranty on parts  while health insurance allows you to have a new machine once every 5 years.     You can also go to the following EDUCATION WEBSITES for further information:   American Academy of Sleep Medicine http://sleepeducation.org  National Sleep Foundation: https://sleepfoundation.org  American Sleep Apnea Association: https://www.sleepapnea.org (for patients with sleep apnea)  Narcolepsy Network: https://www.narcolepsynetwork.org (for patients with narcolepsy)  [employer]: https://www.Sum[facility] Wine Tastingscolepsy.org (for patients with narcolepsy)  Hypersomnia Foundation: https://www.hypersomniafoundation.org (for patients with idiopathic hypersomnia)  RLS foundation: https://www.rls.org (for patients with restless leg syndrome)    IMPORTANT INFORMATION     Call 911 for medical emergencies.  Our offices are generally open from Monday-Friday, 8 am - 5 pm.   There are no supporting services by either the sleep doctors or their staff on weekends and Holidays, or after 5 PM on weekdays.   If you need to get in touch with me, you may either call my office number or you can use [employer].  If a referral for a test, for CPAP, or for another specialist was made, and you have not heard about scheduling this within a week, please call scheduling at [phone] ([phone]).  If you are unable to make your appointment for clinic or an overnight study, kindly call the office or sleep testing center at least 48 hours in advance to cancel and reschedule.  If you are on CPAP, please bring your device's card and/or the device to each clinic appointment.   In case of problems with PAP machine or mask interface, please contact your [facility] (Durable Medical Equipment) company first. [facility] is the company who provides you the machine and/or PAP supplies.       PRESCRIPTIONS     We require 7 days advanced notice for prescription refills. If we do not receive the request in this time, we cannot guarantee that your medication will be refilled in  time.    IMPORTANT PHONE NUMBERS     Sleep Medicine Clinic Fax: [phone]  Appointments (for Pediatric Sleep Clinic): [phone] - option 1  Appointments (for Adult Sleep Clinic): [phone] - option 2  Appointments (For Sleep Studies): [phone] - option 3  Behavioral Sleep Medicine: [phone]  Sleep Surgery: [phone]  Nutrition Service: [phone]  Weight management clinics with endocrinology: [phone]  Bariatric Services: [phone] (includes weight loss medications and weight loss surgery)  [facility]: [phone] (offers holistic approaches to weight management)  ENT (Otolaryngology): [phone]  Headache Clinic (Neurology): [phone]  Neurology: [phone]  Psychiatry: [phone]  Pulmonary Function Testing (PFT) Center: [phone]  Pulmonary Medicine: [phone]  StatusPage ([facility]): [phone]      OUR SLEEP TESTING LOCATIONS     Our team will contact you to schedule your sleep study, however, you can contact us as follow:  Main Phone Line (scheduling only): [phone]), option 3  Adult and Pediatric Locations  [facility] (6 years and older): [facility] - 4th floor ([address] [place] [address]) After hours line: [phone]  [facility] at [facility] (Main campus: All ages): [facility], 6th floor. After hours line: [phone]   [place] (5 years and older; younger considered on case-by-case basis): [address]; [address], [address]. Scheduling  After hours line: [phone]   [place] (6 years and older): [address]; [address]; [address]   [place] (6 years and older): [address]  After hours line: [phone]   [place] (13 years and older) in [place]: [address], 2nd floor  After hours line: [phone]  [facility] (13 year and older): [address]  After hours line:  "[phone]     Adult Only Locations:   [place] (18 years and older): [address] [place] [address], 2nd floor   [place] (18 years and older): [address]; 4th floor  After hours line: [phone]   [place] (18 years and older) at [place]: [address]  After hours line: [phone]     CONTACTING YOUR SLEEP MEDICINE PROVIDER AND SLEEP TEAM      For issues with your machine or mask interface, please call your DME provider first. DME stands for durable medical company. DME is the company who provides you the machine and/or PAP supplies / accessories.   To schedule, cancel, or reschedule SLEEP STUDY APPOINTMENTS, please call the Main Phone Line at [phone] - option 3.   To schedule, cancel, or reschedule CLINIC APPOINTMENTS, you can do it in \"MyChart\", call [phone] (direct line of [facility]), call [phone] (direct line of [facility]), or call the Main Phone Line at [phone] - option 2  For CLINICAL QUESTIONS or MEDICATION REFILLS, please call direct line for Adult Sleep Nurses at [phone].   Lastly, you can also send a message directly to your provider through \"My Chart\", which is the email service through your  Records Account: [email or web address]       Here at [facility], we wish you a restful sleep!    "

## 2025-02-18 ENCOUNTER — CLINICAL SUPPORT (OUTPATIENT)
Dept: AUDIOLOGY | Facility: CLINIC | Age: 70
End: 2025-02-18
Payer: MEDICARE

## 2025-02-18 DIAGNOSIS — H66.92 LEFT OTITIS MEDIA, UNSPECIFIED OTITIS MEDIA TYPE: ICD-10-CM

## 2025-02-18 DIAGNOSIS — H90.A22 SENSORINEURAL HEARING LOSS (SNHL) OF LEFT EAR WITH RESTRICTED HEARING OF RIGHT EAR: Primary | ICD-10-CM

## 2025-02-18 PROCEDURE — 92567 TYMPANOMETRY: CPT

## 2025-02-18 PROCEDURE — 92557 COMPREHENSIVE HEARING TEST: CPT

## 2025-02-18 ASSESSMENT — PAIN - FUNCTIONAL ASSESSMENT: PAIN_FUNCTIONAL_ASSESSMENT: 0-10

## 2025-02-18 ASSESSMENT — PAIN SCALES - GENERAL: PAINLEVEL_OUTOF10: 0 - NO PAIN

## 2025-02-18 NOTE — PROGRESS NOTES
AUDIOLOGY ADULT AUDIOMETRIC EVALUATION     Name: Lauren Rivers   : 1955 Age: 69 y.o.   Date of Evaluation: 2025 Time: 7064-6105     IMPRESSIONS   Hearing sensitivity essentially within normal limits in the right ear, and Hearing sensitivity within normal limits sloping to mild sensorineural hearing loss in the left ear.   Word recognition in quiet and in noise is excellent in both ears.  Tympanometry indicates normal middle ear pressure and tympanic membrane mobility in both ears.   Amplification needs: Consider amplification if hearing loss begins affecting communication.    Today's test results are hearing loss requiring medical/otologic and audiologic follow-up.     RECOMMENDATIONS   Continue medical follow up with primary care provider and/or Ears Nose and Throat (ENT) provider as recommended.  Return for audiologic evaluation in conjunction with medical management to monitor hearing sensitivity and assess middle ear status or sooner should concerns arise. The audiology department can be reached at [phone] to schedule an appointment.   Avoid exposure to loud sounds by moving away from the noise, turning down the volume, or wearing proper hearing protection correctly.  Consider use of good communication strategies. These include but are not limited to the following: get Lauren's attention before speaking to her, close the distance between Lauren and who is speaking, limit background noise, allow Lauren access to visual cues (i.e. facial expressions/mouth movements, pictures, written instructions, etc.). When in situations where background noise cannot be avoided, position yourself so that the background noise is to your back, and you communication partner is seated in front of you, ideally with a quiet area or wall behind them.     HISTORY   History obtained from patient report and chart review; please see medical records for complete history. Ms. Rivers was seen today for an initial  audiologic evaluation at the request of Triston Fournier MD.    Reason for visit: Left ear had been plugged up with onset in October, has resolved. Still hears crackling in left ear with swallowing.   Change in Hearing: yes in both ears; background noise seems confusing now.   Tinnitus: denied  Otalgia: denied  Aural Pressure/Fullness: denied  Recent Ear Infections/Otorrhea: denied; had left fungal otitis externa treated by Dr. Fournier   Dizziness: denied  History of Ear Surgeries: denied  History of Noise Exposure: yes, concerts   Hearing Aid Use: None  Family History of Hearing Loss: Yes, father wore hearing aids   Falls within the last year: denied  Other Significant History: denied    See upcoming encounter with Triston Fournier MD in the Ears Nose and Throat (ENT) department for additional information.     Recall from previous encounter with Triston Fournier MD on 12/18/2024: Lauren Rivers is a 69 y.o. female presenting for follow up of treatment for fungal OE on the left with associated TM perforation and evidence of otitis media. Reports no ongoing otalgia, otorrhea, but ongoing L ear fullness. Some sinus congestion but no sinus pain or purulent rhinorrhea/PND. Recall- Lauren Rivers is a 69 y.o. female presenting for left ear fullness, otalgia, otorrhea. Reports recent treatments for otitis with levaquin and cortisporin and more recently augmentin/ofloxin drops.  Multiple ear irrigations as well. Noted black debris from left EAC yesterday. No significant prior history of recurrent otitis. No inciting illness/trauma. No significant ongoing sinusitis. 69yoF with recent left OE and TM perforation, treated with clotrimazole for concerns for fungal component. Culture confirmed candida. TM perforation closed, but middle ear effusion with some associated erythema and pale fluid in middle ear. Will treat with amoxicillin, mucinexD, medrol and rhinitis medications to aid in resolution. Audiogram to aid in eval.    EVALUATION  "AND PATIENT EDUCATION   The following is a brief interpretation of the obtained findings from the audiologic evaluation. Discussed results and recommendations with Ms. Rivers. Questions were addressed and the patient was encouraged to contact our department at [phone] should concerns arise.     TEST RESULTS - See scanned audiogram in \"Media.\"   Otoscopic Evaluation:   Right Ear: Ear canal clear, tympanic membrane visualized.   Left Ear: Ear canal clear, tympanic membrane visualized.       Tympanometry (226 Hz): An objective evaluation of middle ear function. CPT code: 64260   Right Ear: Type A, middle ear pressure and tympanic membrane compliance within normal limits.   Left Ear: Type A, middle ear pressure and tympanic membrane compliance within normal limits.       Acoustic Reflexes: An objective measure of auditory and facial nerve pathways.   (Probe) Right Ear (ipsi right stimulus ear; contralateral left stimulus ear):   (Ipsilateral) Screened at 1000 Hz, response present.   (Probe) Left Ear (ipsi left stimulus ear; contralateral right stimulus ear):   (Ipsilateral) Screened at 1000 Hz, response present.       Distortion Product Otoacoustic Emissions (DPOAE): An objective measurement of responses generated by the cochlea when simultaneously stimulated by two pure tone frequencies. CPT code: 67014   Right Ear: Did not test.   Left Ear: Did not test.   Present OAEs suggest normal or near cochlear outer hair cell function for corresponding frequency region(s). Absent OAEs with normal middle ear function can be consistent with some degree of hearing loss. Assessment of cochlear outer hair cell function may be impacted by outer or middle ear function.       Test technique: Conventional Audiometry via insert earphones.   An evaluation of hearing sensitivity via air and bone conduction and speech recognition. CPT code: 22510    Reliability: good       Pure Tone Audiometry:    Right Ear: Hearing sensitivity " essentially within normal limits for 125-8000 Hz, slight hearing loss notch at 6000 Hz.    Left Ear: Hearing sensitivity within normal limits for 125-2000 Hz, sloping to mild sensorineural hearing loss through 8000 Hz.       Speech Audiometry:    Right Ear: Speech Reception Threshold (SRT) was obtained at 5 dB HL. This is in good agreement with three frequency Pure Tone Average (PTA).  Word Recognition scores in quiet were excellent (100%) when words were presented at 55 dB HL. These results are based on [facility] Auditory Test No.6 (NU-6) Ordered by difficulty (N=10).   Left Ear: Speech Reception Threshold (SRT) was obtained at 10 dB HL. This is in good agreement with three frequency Pure Tone Average (PTA).  Word Recognition scores in quiet were excellent (100%) when words were presented at 60 dB HL. These results are based on [facility] Auditory Test No.6 (NU-6) Ordered by difficulty (N=10).   Binaural: Speech in Noise Quick-SIN tested was administered at 70 dB HL, and testing revealed a signal to noise ratio (SNR) loss of 1.0, which is categorized as normal/near normal (0-3 dB). With normal/near normal (0-3 dB), patient may hear better than those with normal hearing are able to in noise.       Comparison to previous results: No previous results available.          Trang Wallace, EDELMIRA, CCC-A  Licensed Clinical Audiologist    Degree of Hearing Decibel Range  Key   Within Normal Limits 0-20  CNT Could Not Test   Slight 21-25  DNT Did Not Test   Mild 26-40  ECV Ear Canal Volume   Moderate 41-55  OAE Otoacoustic Emissions   Moderately-Severe 56-70  SIN Speech in Noise   Severe 71-90  TM Tympanic Membrane   Profound 91+  HA Hearing Aid   Sensorineural Hearing Loss SNHL  MLV Monitored Live Voice   Conductive Hearing Loss CHL  WNL Within Normal Limits   Noise-Induced Hearing Loss NIHL

## 2025-02-19 ENCOUNTER — APPOINTMENT (OUTPATIENT)
Dept: OTOLARYNGOLOGY | Facility: CLINIC | Age: 70
End: 2025-02-19
Payer: MEDICARE

## 2025-02-19 VITALS — WEIGHT: 250 LBS | BODY MASS INDEX: 40.35 KG/M2

## 2025-02-19 DIAGNOSIS — J31.0 CHRONIC RHINITIS: ICD-10-CM

## 2025-02-19 DIAGNOSIS — H69.93 DYSFUNCTION OF BOTH EUSTACHIAN TUBES: Primary | ICD-10-CM

## 2025-02-19 DIAGNOSIS — R09.A2 GLOBUS SENSATION: ICD-10-CM

## 2025-02-19 DIAGNOSIS — H90.3 BILATERAL HIGH FREQUENCY SENSORINEURAL HEARING LOSS: ICD-10-CM

## 2025-02-19 PROCEDURE — 99214 OFFICE O/P EST MOD 30 MIN: CPT | Performed by: GENERAL PRACTICE

## 2025-02-19 PROCEDURE — 1123F ACP DISCUSS/DSCN MKR DOCD: CPT | Performed by: GENERAL PRACTICE

## 2025-02-19 PROCEDURE — 1036F TOBACCO NON-USER: CPT | Performed by: GENERAL PRACTICE

## 2025-02-19 PROCEDURE — 1159F MED LIST DOCD IN RCRD: CPT | Performed by: GENERAL PRACTICE

## 2025-02-19 PROCEDURE — 4010F ACE/ARB THERAPY RXD/TAKEN: CPT | Performed by: GENERAL PRACTICE

## 2025-02-19 RX ORDER — TRIAMCINOLONE ACETONIDE 55 UG/1
2 SPRAY, METERED NASAL DAILY
Qty: 16.5 G | Refills: 3 | Status: SHIPPED | OUTPATIENT
Start: 2025-02-19

## 2025-02-19 NOTE — PROGRESS NOTES
Otolaryngology - Head and Neck Surgery Outpatient New Patient Visit Note        Assessment/Plan:   Problem List Items Addressed This Visit    None  Visit Diagnoses         Codes    Dysfunction of both eustachian tubes    -  Primary H69.93    Chronic rhinitis     J31.0    Relevant Medications    triamcinolone (Nasacort) 55 mcg nasal inhaler    Bilateral high frequency sensorineural hearing loss     H90.3    Globus sensation     R09.A2            69yoF with priro TM perforation and now only slight ETD symptoms.  Discussed controls for rhinitis to aid in ETD and bothersome PND.   Trial nasacort.     Consider gaviscon rather than famotidine in the future.     Mildly asymmetric SNHL, not yet meeting indications for MRI.       Follow up:  -plan for follow up in clinic as needed    All of the above findings, impressions, treatment planning and follow up plans were discussed with the patient who indicated understanding.  the patient was instructed to contact or return to clinic sooner if symptoms/signs persist or worsen despite the above management.      Triston Fournier MD  Otolaryngology - Head and Neck Surgery            History Of Present Illness  Lauren Rivers is a 69 y.o. female presenting for follow up of audiogram.    Reports hearing has normalized, with only mild intermittent popping in left ear with swallow.    Reports ongoing congestion, prior flonase not helpful  Reports bothersome PND, globus.   Using famotidine, with uncertain effect.     Recall    Lauren Rivers is a 69 y.o. female presenting for follow up of treatment for fungal OE on the left with associated TM perforation and evidence of otitis media.       Reports no ongoing otalgia, otorrhea, but ongoing L ear fullness.   Some sinus congestion but no sinus pain or purulent rhinorrhea/PND.     Recall     Lauren Rivers is a 69 y.o. female presenting for left ear fullness, otalgia, otorrhea.      Reports recent treatments for otitis with levaquin and  cortisporin and more recently augmentin/ofloxin drops.  Multiple ear irrigations as well.     Noted black debris from left EAC yesterday.       No significant prior history of recurrent otitis  No inciting illness/trauma     No significant ongoing sinusitis.                  Past Medical History  She has a past medical history of Achilles tendinitis, right leg (11/18/2019), Atherosclerotic heart disease of native coronary artery without angina pectoris (03/31/2022), Dorsalgia, unspecified (07/30/2021), Encounter for general adult medical examination without abnormal findings (12/14/2020), Essential (primary) hypertension (12/12/2022), Metabolic syndrome (10/11/2022), Other chest pain (09/15/2020), Other malaise (09/11/2019), Other specified disorders of synovium and tendon, other site (01/10/2020), Pain in right ankle and joints of right foot (03/05/2020), Pain in right knee, Pain in unspecified ankle and joints of unspecified foot (11/14/2019), Personal history of diseases of the skin and subcutaneous tissue (12/10/2019), Personal history of other diseases of the musculoskeletal system and connective tissue (12/18/2017), Personal history of other diseases of the nervous system and sense organs (01/31/2021), Personal history of other diseases of the nervous system and sense organs (10/12/2015), Personal history of other diseases of urinary system (04/30/2021), Personal history of other endocrine, nutritional and metabolic disease (12/15/2017), Personal history of other infectious and parasitic diseases (04/16/2014), Personal history of other mental and behavioral disorders (03/08/2021), Personal history of other specified conditions (04/26/2016), Personal history of other specified conditions (05/25/2022), Personal history of other specified conditions (07/29/2021), Personal history of other specified conditions (05/05/2020), Personal history of other specified conditions (04/29/2016), Rash and other nonspecific  skin eruption (12/24/2020), and Unspecified inflammation of eyelid (01/26/2021).    Surgical History  She has a past surgical history that includes Coronary artery bypass graft (04/26/2016) and Wrist fracture surgery (Left).     Social History  She reports that she has never smoked. She has never used smokeless tobacco. She reports that she does not drink alcohol and does not use drugs.    Family History  Family History   Problem Relation Name Age of Onset    Diabetes Mother      Thyroid disease Mother      Diabetes Father      Kidney disease Father      Hypertension Father      Glaucoma Father      Breast cancer Maternal Grandmother  65        Allergies  Oxycodone and Tramadol    Review of Systems  ROS: Pertinent positives as noted in HPI.    - CONSTITUTIONAL: Does not report weight loss, fever or chills.    - HEENT:   Ear: Does not report tinnitus, vertigo, hearing loss, otalgia, otorrhea  Nose: Does not report  ,  , epistaxis, decreased smell  Throat: Does not report pain, dysphagia, odynophagia  Larynx: Does not report hoarseness,  difficulty breathing, pain with speaking (odynophonia)  Neck: Does not report new masses, pain, swelling  Face: Does not report sinus pain, pressure, swelling, numbness, weakness     - RESPIRATORY: Does not report SOB or cough.    - CV: Does not report palpitations or chest pain.     - GI: Does not report abdominal pain, nausea, vomiting or diarrhea.    - : Does not report dysuria or urinary frequency.    - MSK: Does not report myalgia or joint pain.    - SKIN: Does not report rash or pruritus.    - NEUROLOGICAL: Does not report headache or syncope.    - PSYCHIATRIC: Does not report recent changes in mood. Does not report anxiety or depression.         Physical Exam:     GENERAL:   Alert & Oriented to person, place and time; Normal affect and appearance. Well developed and well nourished. Conversant & cooperative with examination.     HEAD:   Normocephalic, atraumatic. No sinus  tenderness to palpation. Normal parotid bilaterally. Normal facial strength.     NEUROLOGIC:   Cranial nerves II-XII grossly intact, gait WNL. Normal mood and affect.    EYES:   Extraocular movements intact. Pupils equal, round, reactive to light and accommodation. No nystagmus, no ptosis. no scleral injection.    EAR:   Normal auricle. No discomfort or TTP with manipulation.   Handheld otoscopic exam showed normal external auditory canals bilaterally. No purulence or EAC inflammation. Minimal cerumen.   Right tympanic membrane clear and mobile without evidence of perforation, retraction or middle ear effusion.   Left tympanic membrane clear and mobile without evidence of perforation, retraction or middle ear effusion.     NOSE:   No external deformity. No external nasal lesions, lacerations, or scars. Nasal tip symmetrical with normal nasal valves.   Nasal cavity with essentially midline septum, edematous  mucosa and turbinates. No lesions, masses, purulence or polyps.     OC/OP:   Mucous membranes moist, no masses, lesions or exudates.   Normal tongue, floor of mouth, teeth, gums, lips. Normal posterior pharyngeal wall.    Normal tonsils without erythema, exudate or obvious calculi     NECK:   No neck masses or thyroid enlargement. Trachea midline. No tenderness to palpation    LYMPHATIC:   No cervical lymphadenopathy.     RESPIRATORY:   Symmetric chest elevation & no retractions. No significant hoarseness. No increased work of breathing.    CV:   No clubbing or cyanosis. No obvious edema    Skin:   No facial rashes, vesicles or lesions.     Extremities:   No gross abnormalities      Clinic Procedure        Information review:  External sources (notes, imaging, lab results) listed below personally reviewed to aid in medical decision making process.  -  -audio 2/18/25  -

## 2025-03-19 DIAGNOSIS — K21.9 LARYNGOPHARYNGEAL REFLUX (LPR): ICD-10-CM

## 2025-03-19 RX ORDER — FAMOTIDINE 20 MG/1
20 TABLET, FILM COATED ORAL NIGHTLY
Qty: 30 TABLET | Refills: 0 | Status: SHIPPED | OUTPATIENT
Start: 2025-03-19

## 2025-04-15 ENCOUNTER — APPOINTMENT (OUTPATIENT)
Dept: PRIMARY CARE | Facility: CLINIC | Age: 70
End: 2025-04-15
Payer: MEDICARE

## 2025-04-15 VITALS
OXYGEN SATURATION: 94 % | HEART RATE: 62 BPM | BODY MASS INDEX: 41.32 KG/M2 | HEIGHT: 65 IN | WEIGHT: 248 LBS | SYSTOLIC BLOOD PRESSURE: 148 MMHG | DIASTOLIC BLOOD PRESSURE: 74 MMHG | TEMPERATURE: 97.7 F

## 2025-04-15 DIAGNOSIS — I25.10 ATHEROSCLEROSIS OF NATIVE CORONARY ARTERY OF NATIVE HEART WITHOUT ANGINA PECTORIS: ICD-10-CM

## 2025-04-15 DIAGNOSIS — I10 BENIGN ESSENTIAL HYPERTENSION: ICD-10-CM

## 2025-04-15 DIAGNOSIS — Z00.00 MEDICARE ANNUAL WELLNESS VISIT, SUBSEQUENT: Primary | ICD-10-CM

## 2025-04-15 DIAGNOSIS — E78.5 HYPERLIPIDEMIA LDL GOAL <70: ICD-10-CM

## 2025-04-15 DIAGNOSIS — E11.9 CONTROLLED TYPE 2 DIABETES MELLITUS WITHOUT COMPLICATION, WITHOUT LONG-TERM CURRENT USE OF INSULIN: ICD-10-CM

## 2025-04-15 DIAGNOSIS — E66.01 MORBID OBESITY WITH BMI OF 40.0-44.9, ADULT (MULTI): ICD-10-CM

## 2025-04-15 DIAGNOSIS — Z00.00 PHYSICAL EXAM: ICD-10-CM

## 2025-04-15 PROBLEM — I87.2 VENOUS (PERIPHERAL) INSUFFICIENCY: Status: ACTIVE | Noted: 2024-10-25

## 2025-04-15 PROCEDURE — 1123F ACP DISCUSS/DSCN MKR DOCD: CPT | Performed by: INTERNAL MEDICINE

## 2025-04-15 PROCEDURE — 99397 PER PM REEVAL EST PAT 65+ YR: CPT | Performed by: INTERNAL MEDICINE

## 2025-04-15 PROCEDURE — 3077F SYST BP >= 140 MM HG: CPT | Performed by: INTERNAL MEDICINE

## 2025-04-15 PROCEDURE — 1036F TOBACCO NON-USER: CPT | Performed by: INTERNAL MEDICINE

## 2025-04-15 PROCEDURE — 3008F BODY MASS INDEX DOCD: CPT | Performed by: INTERNAL MEDICINE

## 2025-04-15 PROCEDURE — 1159F MED LIST DOCD IN RCRD: CPT | Performed by: INTERNAL MEDICINE

## 2025-04-15 PROCEDURE — 3078F DIAST BP <80 MM HG: CPT | Performed by: INTERNAL MEDICINE

## 2025-04-15 PROCEDURE — 4010F ACE/ARB THERAPY RXD/TAKEN: CPT | Performed by: INTERNAL MEDICINE

## 2025-04-15 PROCEDURE — 1170F FXNL STATUS ASSESSED: CPT | Performed by: INTERNAL MEDICINE

## 2025-04-15 PROCEDURE — 1158F ADVNC CARE PLAN TLK DOCD: CPT | Performed by: INTERNAL MEDICINE

## 2025-04-15 PROCEDURE — G0439 PPPS, SUBSEQ VISIT: HCPCS | Performed by: INTERNAL MEDICINE

## 2025-04-15 RX ORDER — METFORMIN HYDROCHLORIDE 500 MG/1
500 TABLET ORAL
Qty: 180 TABLET | Refills: 3 | Status: SHIPPED | OUTPATIENT
Start: 2025-04-15 | End: 2026-04-15

## 2025-04-15 RX ORDER — METFORMIN HYDROCHLORIDE 500 MG/1
500 TABLET ORAL DAILY
Qty: 90 TABLET | Refills: 3 | Status: SHIPPED | OUTPATIENT
Start: 2025-04-15 | End: 2025-04-15 | Stop reason: SDUPTHER

## 2025-04-15 ASSESSMENT — ACTIVITIES OF DAILY LIVING (ADL)
DOING_HOUSEWORK: INDEPENDENT
BATHING: INDEPENDENT
TAKING_MEDICATION: INDEPENDENT
DRESSING: INDEPENDENT
GROCERY_SHOPPING: INDEPENDENT
MANAGING_FINANCES: INDEPENDENT

## 2025-04-15 ASSESSMENT — PATIENT HEALTH QUESTIONNAIRE - PHQ9
1. LITTLE INTEREST OR PLEASURE IN DOING THINGS: SEVERAL DAYS
3. TROUBLE FALLING OR STAYING ASLEEP OR SLEEPING TOO MUCH: NOT AT ALL
9. THOUGHTS THAT YOU WOULD BE BETTER OFF DEAD, OR OF HURTING YOURSELF: NOT AT ALL
SUM OF ALL RESPONSES TO PHQ9 QUESTIONS 1 AND 2: 1
SUM OF ALL RESPONSES TO PHQ QUESTIONS 1-9: 5
8. MOVING OR SPEAKING SO SLOWLY THAT OTHER PEOPLE COULD HAVE NOTICED. OR THE OPPOSITE, BEING SO FIGETY OR RESTLESS THAT YOU HAVE BEEN MOVING AROUND A LOT MORE THAN USUAL: NOT AT ALL
2. FEELING DOWN, DEPRESSED OR HOPELESS: NOT AT ALL
1. LITTLE INTEREST OR PLEASURE IN DOING THINGS: NOT AT ALL
2. FEELING DOWN, DEPRESSED OR HOPELESS: NOT AT ALL
6. FEELING BAD ABOUT YOURSELF - OR THAT YOU ARE A FAILURE OR HAVE LET YOURSELF OR YOUR FAMILY DOWN: NOT AT ALL
4. FEELING TIRED OR HAVING LITTLE ENERGY: NEARLY EVERY DAY
5. POOR APPETITE OR OVEREATING: NOT AT ALL
SUM OF ALL RESPONSES TO PHQ9 QUESTIONS 1 AND 2: 0
7. TROUBLE CONCENTRATING ON THINGS, SUCH AS READING THE NEWSPAPER OR WATCHING TELEVISION: SEVERAL DAYS

## 2025-04-15 NOTE — ASSESSMENT & PLAN NOTE
increase metformin to bid, could help weight management  Orders:    Hemoglobin A1C; Future    Basic Metabolic Panel; Future    metFORMIN (Glucophage) 500 mg tablet; Take 1 tablet (500 mg) by mouth 2 times daily (morning and late afternoon).

## 2025-04-15 NOTE — PROGRESS NOTES
"Wendy Bright MD  SUBJECTIVE:     Lauren Rivers is a 69 y.o. female presenting for her annual physical/wellness.     Doing well overall.   Has DM, CAD, htn, hyperlipidemia, obesity, arthritis back and knees, CATHY      Colon screening: will be due for Colonoscopy next year  Last pap: na  Last mammogram: has appointment for 6 months Follow up mammogram   Dexa Up to date   Vaccines Up to date mostly. I do recommend RSV, and covid booster.  Diet:   healthy in general  Exercises:  somewhat, limited by back pain knee pain  Lab Results   Component Value Date    HGBA1C 5.7 (H) 10/16/2024     Lab Results   Component Value Date    CREATININE 0.95 04/23/2024     Lab Results   Component Value Date    WBC 6.1 04/23/2024    HGB 14.1 04/23/2024    HCT 44.2 04/23/2024    MCV 88 04/23/2024     04/23/2024     Lab Results   Component Value Date    CHOL 125 04/23/2024    CHOL 116 05/04/2023    CHOL 142 05/24/2022     Lab Results   Component Value Date    HDL 44.0 04/23/2024    HDL 39.0 (A) 05/04/2023    HDL 41.4 05/24/2022     Lab Results   Component Value Date    LDLCALC 35 04/23/2024     Lab Results   Component Value Date    TRIG 231 (H) 04/23/2024    TRIG 158 (H) 05/04/2023    TRIG 160 (H) 05/24/2022     No components found for: \"CHOLHDL\"       ROS:  Feeling well. No dyspnea or chest pain on exertion. No abdominal pain, change in bowel habits, black or bloody stools. No urinary tract or  symptoms.  No breast concerns. No neurological complaints.    OBJECTIVE:   The patient appears well, alert, oriented x 3, in no distress.   /74   Pulse 62   Temp 36.5 °C (97.7 °F)   Ht 1.638 m (5' 4.5\")   Wt 112 kg (248 lb)   SpO2 94%   BMI 41.91 kg/m²   ENT normal.  Neck supple. No adenopathy or thyromegaly. MIKE. Lungs are clear, good air entry, no wheezes, rhonchi or rales. S1 and S2 normal, no murmurs, regular rate and rhythm. Abdomen is soft without tenderness, guarding, mass or organomegaly.  exam deferred to Gyn. " Extremities show no edema, normal peripheral pulses. Neurological is normal without focal findings.    Assessment & Plan  Medicare annual wellness visit, subsequent         Physical exam         Controlled type 2 diabetes mellitus without complication, without long-term current use of insulin  increase metformin to bid, could help weight management  Orders:    Hemoglobin A1C; Future    Basic Metabolic Panel; Future    metFORMIN (Glucophage) 500 mg tablet; Take 1 tablet (500 mg) by mouth 2 times daily (morning and late afternoon).    Morbid obesity with BMI of 40.0-44.9, adult (Multi)  Pt has appointment for consultation for weight management tomorrow.       Benign essential hypertension         Atherosclerosis of native coronary artery of native heart without angina pectoris         Hyperlipidemia LDL goal <70              Medicare Wellness Billing Compliance Satisfied    *This is a visual tool to show completion of required items on the day of the visit. Green checks will only appear on the date of visit.    Review all medications by prescribing practitioner or clinical pharmacist (such as prescriptions, OTCs, herbal therapies and supplements) documented in the medical record    Past Medical, Surgical, and Family History reviewed and updated in chart    Tobacco Use Reviewed    Alcohol Use Reviewed    Illicit Drug Use Reviewed    PHQ2/9    Falls in Last Year Reviewed    Home Safety Risk Factors Reviewed    Cognitive Impairment Reviewed    Patient Self Assessment and Health Status    Current Diet Reviewed    Exercise Frequency    ADL - Hearing Impairment    ADL - Bathing    ADL - Dressing    ADL - Walks in Home    IADL - Managing Finances    IADL - Grocery Shopping    IADL - Taking Medications    IADL - Doing Housework

## 2025-04-16 LAB
ANION GAP SERPL CALCULATED.4IONS-SCNC: 7 MMOL/L (CALC) (ref 7–17)
BUN SERPL-MCNC: 19 MG/DL (ref 7–25)
BUN/CREAT SERPL: NORMAL (CALC) (ref 6–22)
CALCIUM SERPL-MCNC: 10.1 MG/DL (ref 8.6–10.4)
CHLORIDE SERPL-SCNC: 101 MMOL/L (ref 98–110)
CO2 SERPL-SCNC: 30 MMOL/L (ref 20–32)
CREAT SERPL-MCNC: 0.9 MG/DL (ref 0.5–1.05)
EGFRCR SERPLBLD CKD-EPI 2021: 69 ML/MIN/1.73M2
EST. AVERAGE GLUCOSE BLD GHB EST-MCNC: 131 MG/DL
EST. AVERAGE GLUCOSE BLD GHB EST-SCNC: 7.3 MMOL/L
GLUCOSE SERPL-MCNC: 98 MG/DL (ref 65–99)
HBA1C MFR BLD: 6.2 %
POTASSIUM SERPL-SCNC: 4.3 MMOL/L (ref 3.5–5.3)
SODIUM SERPL-SCNC: 138 MMOL/L (ref 135–146)

## 2025-04-17 ENCOUNTER — APPOINTMENT (OUTPATIENT)
Dept: OTOLARYNGOLOGY | Facility: CLINIC | Age: 70
End: 2025-04-17
Payer: MEDICARE

## 2025-04-17 VITALS — WEIGHT: 248 LBS | BODY MASS INDEX: 41.91 KG/M2

## 2025-04-17 DIAGNOSIS — R05.3 CHRONIC COUGH: Primary | ICD-10-CM

## 2025-04-17 DIAGNOSIS — K21.9 LARYNGOPHARYNGEAL REFLUX (LPR): ICD-10-CM

## 2025-04-17 DIAGNOSIS — H69.93 DYSFUNCTION OF BOTH EUSTACHIAN TUBES: ICD-10-CM

## 2025-04-17 PROCEDURE — 99213 OFFICE O/P EST LOW 20 MIN: CPT | Performed by: GENERAL PRACTICE

## 2025-04-17 PROCEDURE — 1159F MED LIST DOCD IN RCRD: CPT | Performed by: GENERAL PRACTICE

## 2025-04-17 PROCEDURE — 1036F TOBACCO NON-USER: CPT | Performed by: GENERAL PRACTICE

## 2025-04-17 PROCEDURE — 1123F ACP DISCUSS/DSCN MKR DOCD: CPT | Performed by: GENERAL PRACTICE

## 2025-04-17 PROCEDURE — 4010F ACE/ARB THERAPY RXD/TAKEN: CPT | Performed by: GENERAL PRACTICE

## 2025-04-17 PROCEDURE — G8433 SCR FOR DEP NOT CPT DOC RSN: HCPCS | Performed by: GENERAL PRACTICE

## 2025-04-17 RX ORDER — MAGNESIUM CARB/ALUMINUM HYDROX 105-160MG
TABLET,CHEWABLE ORAL
Qty: 90 TABLET | Refills: 3 | Status: SHIPPED | OUTPATIENT
Start: 2025-04-17

## 2025-04-17 NOTE — PROGRESS NOTES
Otolaryngology - Head and Neck Surgery Outpatient Established Patient Visit Note        Assessment/Plan:   Problem List Items Addressed This Visit           ICD-10-CM       Pulmonary and Pneumonias    Chronic cough - Primary R05.3     Other Visit Diagnoses         Codes      Laryngopharyngeal reflux (LPR)     K21.9    Relevant Medications    aluminum hydrox-magnesium carb (Gaviscon Extra Strength) 160-105 mg tablet,chewable      Dysfunction of both eustachian tubes     H69.93            69yoF with prior TM perforation (now healed) and ETD in the setting of rhinitis and LPR  Limited response to famotidine, discussed transition to trial of gaviscon.   Can discontinue nasacort if no longer necessary        Follow up:  -plan for follow up in clinic as needed    All of the above findings, impressions, treatment planning and follow up plans were discussed with the patient who indicated understanding.  the patient was instructed to contact or return to clinic sooner if symptoms/signs persist or worsen despite the above management.      Triston Fournier MD  Otolaryngology - Head and Neck Surgery            History Of Present Illness  Lauren Rivers is a 69 y.o. female presenting for follow up of rhinitis, LPR, chronic cough.    Reports no ongoing rhinitis, rhinorrhea or ETD symptoms.    Notes some ongoing chronic cough, throat tickle and PND.   Limited relief with use of famotidine.     Mild baseline hearing loss, difficulty in the setting of background noise.     Recall    Lauren Rivers is a 69 y.o. female presenting for follow up of audiogram.     Reports hearing has normalized, with only mild intermittent popping in left ear with swallow.     Reports ongoing congestion, prior flonase not helpful  Reports bothersome PND, globus.   Using famotidine, with uncertain effect.      Recall     Lauren Rivers is a 69 y.o. female presenting for follow up of treatment for fungal OE on the left with associated TM perforation and  evidence of otitis media.       Reports no ongoing otalgia, otorrhea, but ongoing L ear fullness.   Some sinus congestion but no sinus pain or purulent rhinorrhea/PND.     Recall     Lauren Rivers is a 69 y.o. female presenting for left ear fullness, otalgia, otorrhea.      Reports recent treatments for otitis with levaquin and cortisporin and more recently augmentin/ofloxin drops.  Multiple ear irrigations as well.     Noted black debris from left EAC yesterday.       No significant prior history of recurrent otitis  No inciting illness/trauma     No significant ongoing sinusitis.           Past Medical History  She has a past medical history of Achilles tendinitis, right leg (11/18/2019), Atherosclerotic heart disease of native coronary artery without angina pectoris (03/31/2022), Dorsalgia, unspecified (07/30/2021), Encounter for general adult medical examination without abnormal findings (12/14/2020), Essential (primary) hypertension (12/12/2022), Metabolic syndrome (10/11/2022), Other chest pain (09/15/2020), Other malaise (09/11/2019), Other specified disorders of synovium and tendon, other site (01/10/2020), Pain in right ankle and joints of right foot (03/05/2020), Pain in right knee, Pain in unspecified ankle and joints of unspecified foot (11/14/2019), Personal history of diseases of the skin and subcutaneous tissue (12/10/2019), Personal history of other diseases of the musculoskeletal system and connective tissue (12/18/2017), Personal history of other diseases of the nervous system and sense organs (01/31/2021), Personal history of other diseases of the nervous system and sense organs (10/12/2015), Personal history of other diseases of urinary system (04/30/2021), Personal history of other endocrine, nutritional and metabolic disease (12/15/2017), Personal history of other infectious and parasitic diseases (04/16/2014), Personal history of other mental and behavioral disorders (03/08/2021), Personal  history of other specified conditions (04/26/2016), Personal history of other specified conditions (05/25/2022), Personal history of other specified conditions (07/29/2021), Personal history of other specified conditions (05/05/2020), Personal history of other specified conditions (04/29/2016), Rash and other nonspecific skin eruption (12/24/2020), and Unspecified inflammation of eyelid (01/26/2021).    Surgical History  She has a past surgical history that includes Coronary artery bypass graft (04/26/2016) and Wrist fracture surgery (Left).     Social History  She reports that she has never smoked. She has never used smokeless tobacco. She reports that she does not drink alcohol and does not use drugs.    Family History  Family History[1]     Allergies  Oxycodone and Tramadol    Review of Systems  ROS: Pertinent positives as noted in HPI.    - CONSTITUTIONAL: Does not report weight loss, fever or chills.    - HEENT:   Ear: Does not report tinnitus, vertigo,    , otalgia, otorrhea  Nose: Does not report congestion, rhinorrhea, epistaxis, decreased smell  Throat: Does not report pain, dysphagia, odynophagia  Larynx: Does not report hoarseness,  difficulty breathing, pain with speaking (odynophonia)  Neck: Does not report new masses, pain, swelling  Face: Does not report sinus pain, pressure, swelling, numbness, weakness     - RESPIRATORY: Does not report SOB or  .    - CV: Does not report palpitations or chest pain.     - GI: Does not report abdominal pain, nausea, vomiting or diarrhea.    - : Does not report dysuria or urinary frequency.    - MSK: Does not report myalgia or joint pain.    - SKIN: Does not report rash or pruritus.    - NEUROLOGICAL: Does not report headache or syncope.    - PSYCHIATRIC: Does not report recent changes in mood. Does not report anxiety or depression.         Physical Exam:     GENERAL:   Alert & Oriented to person, place and time; Normal affect and appearance. Well developed and well  nourished. Conversant & cooperative with examination.     HEAD:   Normocephalic, atraumatic. No sinus tenderness to palpation. Normal parotid bilaterally. Normal facial strength.     NEUROLOGIC:   Cranial nerves II-XII grossly intact, gait WNL. Normal mood and affect.    EYES:   Extraocular movements intact. Pupils equal, round, reactive to light and accommodation. No nystagmus, no ptosis. no scleral injection.    EAR:   Normal auricle. No discomfort or TTP with manipulation.   Handheld otoscopic exam showed normal external auditory canals bilaterally. No purulence or EAC inflammation. Minimal cerumen.   Right tympanic membrane clear and mobile without evidence of perforation, retraction or middle ear effusion.   Left tympanic membrane clear and mobile without evidence of perforation, retraction or middle ear effusion.     NOSE:   No external deformity. No external nasal lesions, lacerations, or scars. Nasal tip symmetrical with normal nasal valves.   Nasal cavity with essentially midline septum, normal mucosa and turbinates. No lesions, masses, purulence or polyps.     OC/OP:   Mucous membranes moist, no masses, lesions or exudates.   Normal tongue, floor of mouth, teeth, gums, lips. Cobblestoning of  posterior pharyngeal wall.    Normal tonsils without erythema, exudate or obvious calculi     NECK:   No neck masses or thyroid enlargement. Trachea midline. No tenderness to palpation    LYMPHATIC:   No cervical lymphadenopathy.     RESPIRATORY:   Symmetric chest elevation & no retractions. No significant hoarseness. No increased work of breathing.    CV:   No clubbing or cyanosis. No obvious edema    Skin:   No facial rashes, vesicles or lesions.     Extremities:   No gross abnormalities      Clinic Procedure        Information review:  External sources (notes, imaging, lab results) listed below personally reviewed to aid in medical decision making process.  -  -  -         [1]   Family History  Problem Relation  Name Age of Onset    Diabetes Mother      Thyroid disease Mother      Diabetes Father      Kidney disease Father      Hypertension Father      Glaucoma Father      Breast cancer Maternal Grandmother  65

## 2025-04-18 DIAGNOSIS — K21.9 LARYNGOPHARYNGEAL REFLUX (LPR): ICD-10-CM

## 2025-04-18 RX ORDER — FAMOTIDINE 20 MG/1
20 TABLET, FILM COATED ORAL NIGHTLY
Qty: 30 TABLET | Refills: 0 | Status: SHIPPED | OUTPATIENT
Start: 2025-04-18

## 2025-05-30 ENCOUNTER — HOSPITAL ENCOUNTER (OUTPATIENT)
Dept: RADIOLOGY | Facility: CLINIC | Age: 70
Discharge: HOME | End: 2025-05-30
Payer: MEDICARE

## 2025-05-30 VITALS — HEIGHT: 65 IN | BODY MASS INDEX: 40.65 KG/M2 | WEIGHT: 244 LBS

## 2025-05-30 DIAGNOSIS — R92.8 OTHER ABNORMAL AND INCONCLUSIVE FINDINGS ON DIAGNOSTIC IMAGING OF BREAST: ICD-10-CM

## 2025-05-30 PROCEDURE — 76642 ULTRASOUND BREAST LIMITED: CPT | Mod: LT

## 2025-05-30 PROCEDURE — 76982 USE 1ST TARGET LESION: CPT

## 2025-05-30 PROCEDURE — 77066 DX MAMMO INCL CAD BI: CPT

## 2025-07-23 ENCOUNTER — TELEPHONE (OUTPATIENT)
Dept: PRIMARY CARE | Facility: CLINIC | Age: 70
End: 2025-07-23
Payer: MEDICARE

## 2025-07-29 ENCOUNTER — APPOINTMENT (OUTPATIENT)
Dept: PRIMARY CARE | Facility: CLINIC | Age: 70
End: 2025-07-29
Payer: MEDICARE

## 2025-07-29 VITALS
DIASTOLIC BLOOD PRESSURE: 69 MMHG | HEIGHT: 64 IN | TEMPERATURE: 98.3 F | SYSTOLIC BLOOD PRESSURE: 138 MMHG | BODY MASS INDEX: 41.48 KG/M2 | OXYGEN SATURATION: 95 % | HEART RATE: 64 BPM | WEIGHT: 243 LBS

## 2025-07-29 DIAGNOSIS — M62.830 SPASM OF MUSCLE OF LOWER BACK: Primary | ICD-10-CM

## 2025-07-29 DIAGNOSIS — G89.29 CHRONIC ABDOMINAL PAIN: ICD-10-CM

## 2025-07-29 DIAGNOSIS — R10.9 CHRONIC ABDOMINAL PAIN: ICD-10-CM

## 2025-07-29 DIAGNOSIS — M17.10 ARTHRITIS OF KNEE: ICD-10-CM

## 2025-07-29 PROBLEM — E66.01 MORBID OBESITY (MULTI): Status: RESOLVED | Noted: 2023-04-06 | Resolved: 2025-07-29

## 2025-07-29 PROCEDURE — 3008F BODY MASS INDEX DOCD: CPT | Performed by: INTERNAL MEDICINE

## 2025-07-29 PROCEDURE — G2211 COMPLEX E/M VISIT ADD ON: HCPCS | Performed by: INTERNAL MEDICINE

## 2025-07-29 PROCEDURE — 3075F SYST BP GE 130 - 139MM HG: CPT | Performed by: INTERNAL MEDICINE

## 2025-07-29 PROCEDURE — 3078F DIAST BP <80 MM HG: CPT | Performed by: INTERNAL MEDICINE

## 2025-07-29 PROCEDURE — 4010F ACE/ARB THERAPY RXD/TAKEN: CPT | Performed by: INTERNAL MEDICINE

## 2025-07-29 PROCEDURE — 99213 OFFICE O/P EST LOW 20 MIN: CPT | Performed by: INTERNAL MEDICINE

## 2025-07-29 PROCEDURE — 1159F MED LIST DOCD IN RCRD: CPT | Performed by: INTERNAL MEDICINE

## 2025-07-29 RX ORDER — TIZANIDINE 4 MG/1
4 TABLET ORAL EVERY 6 HOURS PRN
Qty: 30 TABLET | Refills: 0 | Status: SHIPPED | OUTPATIENT
Start: 2025-07-29 | End: 2025-08-08

## 2025-07-29 RX ORDER — PREDNISONE 10 MG/1
TABLET ORAL
Qty: 30 TABLET | Refills: 0 | Status: SHIPPED | OUTPATIENT
Start: 2025-07-29 | End: 2025-08-08

## 2025-07-30 LAB
CK SERPL-CCNC: 83 U/L (ref 18–225)
ERYTHROCYTE [SEDIMENTATION RATE] IN BLOOD BY WESTERGREN METHOD: 36 MM/H

## 2025-07-30 NOTE — PROGRESS NOTES
"PCP: Wendy Bright MD   I have reviewed existing histories, notes, past medical history, surgical history, social history, family history, med list, allergy list, and immunization, and updated.    HPI:   Pt has had low back/buttock areas on in center and on both sides. No injury prior to onset. Pain felt like muscle tightening, can be quite painful at times. No leg weakness, no Nausea or vomiting to legs. No Fever and chills or other symptoms    Chronic Abdominal pain on and off for at least one year. It is felt behind the umbilicus. Colonoscopy in 2021, showed no significant findings. MR abdomen showed fatty liver, no other significant findings.  No melena or rectal bleeding    Lab Results   Component Value Date    WBC 6.1 04/23/2024    HGB 14.1 04/23/2024    HCT 44.2 04/23/2024     04/23/2024    CHOL 125 04/23/2024    TRIG 231 (H) 04/23/2024    HDL 44.0 04/23/2024    ALT 21 04/23/2024    AST 22 04/23/2024     04/15/2025    K 4.3 04/15/2025     04/15/2025    CREATININE 0.90 04/15/2025    BUN 19 04/15/2025    CO2 30 04/15/2025    HGBA1C 6.2 (H) 04/15/2025     Physical exam:  /69   Pulse 64   Temp 36.8 °C (98.3 °F)   Ht 1.632 m (5' 4.25\")   Wt 110 kg (243 lb)   SpO2 95%   BMI 41.39 kg/m²    Lower sacral area on both sides, somewhat tender to palpation. No leg weakness. No skin lesions on back.    Assessments/orders:   Assessment & Plan  Spasm of muscle of lower back    Orders:    CK; Future    Sedimentation Rate; Future    predniSONE (Deltasone) 10 mg tablet; Take 5 tablets (50 mg) by mouth once daily for 2 days, THEN 4 tablets (40 mg) once daily for 2 days, THEN 3 tablets (30 mg) once daily for 2 days, THEN 2 tablets (20 mg) once daily for 2 days, THEN 1 tablet (10 mg) once daily for 2 days.    tiZANidine (Zanaflex) 4 mg tablet; Take 1 tablet (4 mg) by mouth every 6 hours if needed for muscle spasms for up to 10 days.    Arthritis of knee  This is a renewals  Orders:    Disability " Wili    Chronic abdominal pain    Orders:    Referral to Gastroenterology; Future

## 2025-07-31 ENCOUNTER — OFFICE VISIT (OUTPATIENT)
Dept: GASTROENTEROLOGY | Facility: CLINIC | Age: 70
End: 2025-07-31
Payer: MEDICARE

## 2025-07-31 VITALS — WEIGHT: 244 LBS | BODY MASS INDEX: 41.66 KG/M2 | HEART RATE: 73 BPM | HEIGHT: 64 IN

## 2025-07-31 DIAGNOSIS — K59.00 CONSTIPATION, UNSPECIFIED CONSTIPATION TYPE: ICD-10-CM

## 2025-07-31 DIAGNOSIS — R10.33 PERIUMBILICAL ABDOMINAL PAIN: Primary | ICD-10-CM

## 2025-07-31 PROCEDURE — 3008F BODY MASS INDEX DOCD: CPT | Performed by: INTERNAL MEDICINE

## 2025-07-31 PROCEDURE — 1036F TOBACCO NON-USER: CPT | Performed by: INTERNAL MEDICINE

## 2025-07-31 PROCEDURE — 99204 OFFICE O/P NEW MOD 45 MIN: CPT | Performed by: INTERNAL MEDICINE

## 2025-07-31 PROCEDURE — 1159F MED LIST DOCD IN RCRD: CPT | Performed by: INTERNAL MEDICINE

## 2025-07-31 PROCEDURE — 4010F ACE/ARB THERAPY RXD/TAKEN: CPT | Performed by: INTERNAL MEDICINE

## 2025-07-31 ASSESSMENT — ENCOUNTER SYMPTOMS
SORE THROAT: 0
NERVOUS/ANXIOUS: 0
FEVER: 0
HEADACHES: 0
CHILLS: 0
ADENOPATHY: 0
UNEXPECTED WEIGHT CHANGE: 0
ARTHRALGIAS: 0
MYALGIAS: 0
COUGH: 1
DIFFICULTY URINATING: 0
EYE REDNESS: 0
FATIGUE: 0
SHORTNESS OF BREATH: 0
BRUISES/BLEEDS EASILY: 0
ROS GI COMMENTS: AS PER HPI

## 2025-07-31 NOTE — PATIENT INSTRUCTIONS
I will order a hydrogen breath test to look for SIBO (small intestinal bacterial overgrowth).  You can call [phone] to schedule this.    Try taking Senokot-S (or store brand) since this has Colace in it, plus senna which helps to stimulate the bowels.    Go in for an x-ray when you have symptoms (order is in the chart, just walk in to radiology).

## 2025-07-31 NOTE — PROGRESS NOTES
Assessment/Plan    Lauren Rivers is a 70 y.o. female with PMHx of HTN, DM2, CAD s/p CABG, morbid obesity (BMI 41), CATHY who presents to GI clinic for evaluation of bouts of abdominal pain.    She has had these bouts of pain for multiple years.  The pain is periumbilical and will be severe enough that she is fairly incapacitated for a couple of days until it resolves.  She had a CT scan back in 2022 for these symptoms that was unremarkable.  She had a colonoscopy in 2021 that only showed hemorrhoids and diverticulosis (she is due next year for surveillance due to past h/o polyps).  She does have what sounds like significant constipation.  She takes a stool softener but still spends a long time on the toilet and sometimes has to strain significantly to get her stool out.  Pain could be related to bouts of constipation or bowel gas accumulation.  Would also consider SIBO since she does seem to have decreased GI motility.  Will order hydrogen breath test to rule out SIBO.  Also placed standing order for acute abdominal series, and she can go do this while she is symptomatic with the pain.  Recommend changing from Colace to Senokot-S since this has both a stool softener and a mild stimulant to see if this helps her move her bowels better.         Subjective     History of Present Illness   Lauren Rivers is a 70 y.o. female with PMHx of HTN, DM2, CAD s/p CABG, morbid obesity (BMI 41), CATHY who presents to GI clinic for further evaluation of constipation, abdominal pain, and rectal bleeding.  Patient was almost 15 minutes late for her 30-minute appointment, so was seen for an abbreviated visit  Past few years gets episodes of severe pain behind umbilicus, has to lie in bed  Pain eventually goes away on its own  Had testing in 2022 for these symptoms which didn't reveal anything  Last week was in bed for 2 days, episodes happen around 2-3 times per month  With pain can have mushy stools, but no fevers, nausea,  "vomiting  Feels like bowels are slow, takes a stool softener daily which helps with consistency but still has to strain to get stools to come out  Has a hemorrhoid that bleeds frequently, uses Preparation H  Often times will sit on the toilet for a long time, has to strain (\"Herculean effort\" to pass stool)  No prior abdominal surgeries    Chart review:  Per PCP note, patient has had abdominal pain on and off for over a year  Colonoscopy 4/2021 ([facility]) - sigmoid diverticulosis, internal hemorrhoids, recommended 5-year follow up due to past h/o polyps  Had CT back in 2022 for chronic abdominal pain - cholelithiasis, hepatic steatosis, no acute findings    Past Medical History  As per HPI.     Social History  she  reports that she has never smoked. She has never used smokeless tobacco. She reports that she does not drink alcohol and does not use drugs.     Family History  her family history includes Breast cancer (age of onset: 65) in her maternal grandmother; Diabetes in her father and mother; Glaucoma in her father; Hypertension in her father; Kidney disease in her father; Thyroid disease in her mother.     Review of Systems  Review of Systems   Constitutional:  Negative for chills, fatigue, fever and unexpected weight change.   HENT:  Negative for sore throat.    Eyes:  Negative for redness and visual disturbance.   Respiratory:  Positive for cough. Negative for shortness of breath.    Cardiovascular:  Negative for chest pain.   Gastrointestinal:         As per HPI   Genitourinary:  Negative for difficulty urinating.   Musculoskeletal:  Negative for arthralgias and myalgias.   Skin:  Negative for rash.   Neurological:  Negative for headaches.   Hematological:  Negative for adenopathy. Does not bruise/bleed easily.   Psychiatric/Behavioral:  The patient is not nervous/anxious.    All other systems reviewed and are negative.      Allergies  RX Allergies[1]    Medications  Current Outpatient Medications   Medication " "Instructions    aluminum hydrox-magnesium carb (Gaviscon Extra Strength) 160-105 mg tablet,chewable 1 tablet 2-3 times daily with meals    amLODIPine (NORVASC) 10 mg, Daily    aspirin 81 mg, Daily RT    atenolol (Tenormin) 100 mg tablet atenolol 100 mg tablet    atorvastatin (Lipitor) 40 mg tablet atorvastatin 40 mg tablet    CALCIUM ACETATE ORAL 500 mg, 2 times daily    cetirizine (ZyrTEC) 10 mg tablet Take by mouth.    coenzyme Q-10 100 mg capsule 1 capsule, Daily RT    ERGOCALCIFEROL, VITAMIN D2, IM Take by mouth.    famotidine (PEPCID) 20 mg, oral, Nightly    fenofibrate (Tricor) 145 mg tablet fenofibrate nanocrystallized 145 mg tablet    hydroCHLOROthiazide (HYDRODiuril) 25 mg tablet hydrochlorothiazide 25 mg tablet    losartan (Cozaar) 100 mg tablet Take by mouth.    metFORMIN (GLUCOPHAGE) 500 mg, oral, 2 times daily (morning and late afternoon)    multivit-min-ferrous sulfate (One Daily Multi-Vit w-Mineral) 4.5 mg iron tablet Take by mouth.    omega-3s-dha-epa-fish oil-D3 560 mg-1,680 mg -1,000 unit/5mL liquid Take by mouth.    predniSONE (Deltasone) 10 mg tablet Take 5 tablets (50 mg) by mouth once daily for 2 days, THEN 4 tablets (40 mg) once daily for 2 days, THEN 3 tablets (30 mg) once daily for 2 days, THEN 2 tablets (20 mg) once daily for 2 days, THEN 1 tablet (10 mg) once daily for 2 days.    pseudoephedrine-guaifenesin (Mucinex D)  mg 12 hr tablet 1 tablet, oral, Every 12 hours, Do not crush, chew, or split.    tiZANidine (ZANAFLEX) 4 mg, oral, Every 6 hours PRN    triamcinolone (Nasacort) 55 mcg nasal inhaler 2 sprays, Each Nostril, Daily        Objective     Visit Vitals  Pulse 73   Ht 1.632 m (5' 4.25\")   Wt 111 kg (244 lb)   BMI 41.56 kg/m²   OB Status Postmenopausal   Smoking Status Never   BSA 2.24 m²       Physical Exam  Constitutional:       General: She is not in acute distress.     Appearance: She is obese.   HENT:      Mouth/Throat:      Mouth: Mucous membranes are moist.     Eyes:    "   Extraocular Movements: Extraocular movements intact.       Cardiovascular:      Rate and Rhythm: Normal rate and regular rhythm.   Pulmonary:      Breath sounds: Normal breath sounds.   Abdominal:      General: Bowel sounds are normal. There is no distension.      Palpations: Abdomen is soft.      Tenderness: There is no abdominal tenderness. There is no guarding or rebound.     Musculoskeletal:         General: No swelling.     Skin:     General: Skin is warm and dry.     Neurological:      General: No focal deficit present.      Mental Status: She is alert.     Psychiatric:         Mood and Affect: Mood normal.         Behavior: Behavior normal.           Lab Results   Component Value Date    WBC 6.1 04/23/2024    WBC 7.3 05/24/2022    HGB 14.1 04/23/2024    HGB 14.1 05/24/2022    HCT 44.2 04/23/2024    HCT 42.7 05/24/2022    MCV 88 04/23/2024    MCV 87 05/24/2022     04/23/2024     05/24/2022     Lab Results   Component Value Date     04/15/2025     04/23/2024    K 4.3 04/15/2025    K 4.2 04/23/2024     04/15/2025     04/23/2024    CO2 30 04/15/2025    CO2 31 04/23/2024    BUN 19 04/15/2025    BUN 21 04/23/2024    CREATININE 0.90 04/15/2025    CREATININE 0.95 04/23/2024    CALCIUM 10.1 04/15/2025    CALCIUM 10.2 04/23/2024    PROT 7.2 04/23/2024    PROT 7.2 05/04/2023    BILITOT 0.5 04/23/2024    BILITOT 0.6 05/04/2023    ALKPHOS 45 04/23/2024    ALKPHOS 58 05/04/2023    ALT 21 04/23/2024    ALT 24 05/04/2023    AST 22 04/23/2024    AST 24 05/04/2023    GLUCOSE 98 04/15/2025    GLUCOSE 102 (H) 04/23/2024       Recent Imaging  Imaging  CT ABDOMEN AND PELVIS W IV CONTRAST;  6/13/2022 10:33 am     INDICATION:  worsening intermittent abdominal pain for two years  R10.9: Abdominal  pain.     COMPARISON:  CT abdomen pelvis with contrast 12/01/2020     ACCESSION NUMBER(S):  [id number]     ORDERING CLINICIAN:  GERMAN PAVON     TECHNIQUE:  CT of the abdomen and pelvis was performed.   Standard contiguous  axial images were obtained at 3 mm slice thickness through the  abdomen and pelvis. Coronal and sagittal reconstructions at 3 mm  slice thickness were performed.     90 ml of contrast Omnipaque 350 were administered intravenously  without immediate complication.     FINDINGS:  LOWER CHEST:  The visualized lung base is unremarkable. The heart is normal in size  without pericardial effusion. No pleural effusion is present.  Visualized distal esophagus appears normal.     ABDOMEN:     LIVER:  The liver is normal in size. Diminished attenuation liver in relation  to the spleen.     BILE DUCTS:  The intrahepatic and extrahepatic ducts are not dilated.     GALLBLADDER:  There is cholelithiasis without evidence of inflammatory change.     PANCREAS:  The pancreas appears unremarkable without evidence of ductal  dilatation or masses.     SPLEEN:  The spleen is normal in size.     ADRENAL GLANDS:  Bilateral adrenal glands appear normal.     KIDNEYS AND URETERS:  The kidneys are normal in size and enhance symmetrically.  No  hydroureteronephrosis or nephroureterolithiasis is identified. There  is a low-density lesion involving the superior pole of left kidney  consistent with a cyst. There is a low-density bilobed lesion  involving the midpole of the right kidney stable dating back to  12/01/2020 measuring up to 2.3 cm in transverse dimension favoring  benign process.     PELVIS:     BLADDER:  Bladder is partially decompressed.     REPRODUCTIVE ORGANS:  Low-density areas identified within the endometrial canal unchanged  dating back to 2020 may represent fluid.  No adnexal masses are identified.     BOWEL:  The stomach is unremarkable.   The small and large bowel are normal  in caliber and demonstrate no wall thickening, no obstruction.   The  appendix is normal.     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 Remainder of the  arterial  vasculature and venous vascular are patent.     PERITONEUM/RETROPERITONEUM/LYMPH NODES:  There is no free or loculated fluid collection, no free  intraperitoneal air. The retroperitoneum appears normal.  No  abdominopelvic lymphadenopathy is present.     BONES AND ABDOMINAL WALL:  No suspicious osseous lesions are identified.  Small fat containing  umbilical wall hernia with the defect measuring up to 0.8 cm (79/159).     IMPRESSION:  1. Cholelithiasis without evidence of inflammatory change  2. Findings can be seen in hepatic steatosis; correlation with LFTs  3. Low-density lesion within the midpole of the right kidney  demonstrating stability since 2020 favoring benign process  4. 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 Stable fat containing umbilical wall hernia  6. Nonspecific fluid within the endometrial canal which is not  physiologic given patient's age; consider follow-up with pelvic  ultrasound.    Cardiology, Vascular, and Other Imaging  No other imaging results found for the past 7 days        Galen Vargas MD                [1]   Allergies  Allergen Reactions    Oxycodone Other    Tramadol Other

## 2025-09-18 ENCOUNTER — APPOINTMENT (OUTPATIENT)
Dept: GASTROENTEROLOGY | Facility: CLINIC | Age: 70
End: 2025-09-18
Payer: MEDICARE

## 2025-10-28 ENCOUNTER — APPOINTMENT (OUTPATIENT)
Dept: GASTROENTEROLOGY | Facility: CLINIC | Age: 70
End: 2025-10-28
Payer: MEDICARE

## 2025-12-17 ENCOUNTER — APPOINTMENT (OUTPATIENT)
Dept: UROLOGY | Facility: CLINIC | Age: 70
End: 2025-12-17
Payer: MEDICARE

## 2026-03-04 ENCOUNTER — APPOINTMENT (OUTPATIENT)
Dept: DERMATOLOGY | Facility: CLINIC | Age: 71
End: 2026-03-04
Payer: MEDICARE

## 2026-04-21 ENCOUNTER — APPOINTMENT (OUTPATIENT)
Dept: PRIMARY CARE | Facility: CLINIC | Age: 71
End: 2026-04-21
Payer: MEDICARE